# Patient Record
Sex: MALE | Race: WHITE | Employment: OTHER | URBAN - METROPOLITAN AREA
[De-identification: names, ages, dates, MRNs, and addresses within clinical notes are randomized per-mention and may not be internally consistent; named-entity substitution may affect disease eponyms.]

---

## 2021-07-06 ENCOUNTER — NURSE TRIAGE (OUTPATIENT)
Dept: OTHER | Age: 66
End: 2021-07-06

## 2021-07-06 NOTE — TELEPHONE ENCOUNTER
Additional Information   Commented on: Answer Assessment - Initial Assessment Questions     Caller was informed to be evaluated at the ED in the next 3-4 hours and ot call back for additional questions and agreed.     Brief description of triage: See above    Triage indicates for patient to ED in 3-4 hours    Protocols used: NEUROLOGIC DEFICIT-ADULT-AH

## 2021-07-06 NOTE — TELEPHONE ENCOUNTER
Reason for Disposition   [1] Numbness (i.e., loss of sensation) of the face, arm / hand, or leg / foot on one side of the body AND [2] gradual onset (e.g., days to weeks) AND [3] present now    Answer Assessment - Initial Assessment Questions  1. LOCATION: \"Where is the swelling located? \"  (e.g., left, right, both knees)        Right knee     2. SIZE and DESCRIPTION: \"What does the swelling look like? \"  (e.g., entire knee, localized)       Small bubble that is black and blue a couple inches down from knee, above calf      3. ONSET: \"When did the swelling start? \" \"Does it come and go, or is it there all the time? \"       July 4th    4. PAIN: \"Is there any pain? \" If so, ask: \"How bad is it? \" (Scale 1-10; or mild, moderate, severe)        0/10 when feet up, walking 3/10    5. SETTING: \"Has there been any recent work, exercise or other activity that involved that part of the body? \"        Normally does a lot of walking    6. AGGRAVATING FACTORS: \"What makes the knee swelling worse? \" (e.g., walking, climbing stairs, running)       None    7. ASSOCIATED SYMPTOMS: \"Is there any pain or redness? \"        Pain, black and blue    8. OTHER SYMPTOMS: \"Do you have any other symptoms? \" (e.g., chest pain, difficulty breathing, fever, calf pain)         None      Caller stated he has a vein on the inside of his knee that popped up. Caller stated he has knee pain and numbness  to his toes. Caller stated he has a Small bubble that is black and blue a couple inches down from knee, above calf that he noticed July 4th. Pain is 4/10 which he noticed that has been present for over a couple of weeks. Answer Assessment - Initial Assessment Questions  1. SYMPTOM: \"What is the main symptom you are concerned about? \" (e.g., weakness, numbness)        Numbness and tingling    2. ONSET: \"When did this start? \" (minutes, hours, days; while sleeping)        This morning    3.  LAST NORMAL: \"When was the last time you were normal (no symptoms)? \"        Yesterday    4. PATTERN \"Does this come and go, or has it been constant since it started? \"  \"Is it present now? \"        Constant    5. CARDIAC SYMPTOMS: \"Have you had any of the following symptoms: chest pain, difficulty breathing, palpitations? \"       None    6. NEUROLOGIC SYMPTOMS: \"Have you had any of the following symptoms: headache, dizziness, vision loss, double vision, changes in speech, unsteady on your feet? \"       None    7. OTHER SYMPTOMS: \"Do you have any other symptoms? \"        None    Caller stated he has a vein on the inside of his knee that popped up. Caller stated he has knee pain and numbness  to his toes. Caller stated he has a Small bubble that is black and blue a couple inches down from knee, above calf that he noticed July 4th. Pain is 4/10 which he noticed that has been present for over a couple of weeks. Caller stated he has pain when walking on it.     Protocols used: NEUROLOGIC DEFICIT-ADULT-, KNEE SWELLING-ADULT-

## 2021-07-20 ENCOUNTER — TELEPHONE (OUTPATIENT)
Dept: VASCULAR SURGERY | Age: 66
End: 2021-07-20

## 2021-07-20 NOTE — TELEPHONE ENCOUNTER
Received referral from Dr. Jose Manuel Tian for phlebitis, left message for patient to schedule appointment to see Dr. Jenn Walsh.

## 2021-08-04 ENCOUNTER — TELEPHONE (OUTPATIENT)
Dept: VASCULAR SURGERY | Age: 66
End: 2021-08-04

## 2021-08-05 ENCOUNTER — OFFICE VISIT (OUTPATIENT)
Dept: VASCULAR SURGERY | Age: 66
End: 2021-08-05
Payer: COMMERCIAL

## 2021-08-05 VITALS — SYSTOLIC BLOOD PRESSURE: 128 MMHG | DIASTOLIC BLOOD PRESSURE: 86 MMHG

## 2021-08-05 DIAGNOSIS — I82.411 ACUTE DEEP VEIN THROMBOSIS (DVT) OF FEMORAL VEIN OF RIGHT LOWER EXTREMITY (HCC): ICD-10-CM

## 2021-08-05 DIAGNOSIS — M79.89 LEG SWELLING: ICD-10-CM

## 2021-08-05 DIAGNOSIS — I80.01 SUPERFICIAL PHLEBITIS OF RIGHT LEG: ICD-10-CM

## 2021-08-05 DIAGNOSIS — I83.813 VARICOSE VEINS OF BOTH LOWER EXTREMITIES WITH PAIN: ICD-10-CM

## 2021-08-05 PROCEDURE — G8427 DOCREV CUR MEDS BY ELIG CLIN: HCPCS | Performed by: SURGERY

## 2021-08-05 PROCEDURE — G8421 BMI NOT CALCULATED: HCPCS | Performed by: SURGERY

## 2021-08-05 PROCEDURE — 99204 OFFICE O/P NEW MOD 45 MIN: CPT | Performed by: SURGERY

## 2021-08-05 PROCEDURE — 1036F TOBACCO NON-USER: CPT | Performed by: SURGERY

## 2021-08-05 PROCEDURE — 4040F PNEUMOC VAC/ADMIN/RCVD: CPT | Performed by: SURGERY

## 2021-08-05 PROCEDURE — 3017F COLORECTAL CA SCREEN DOC REV: CPT | Performed by: SURGERY

## 2021-08-05 PROCEDURE — 1123F ACP DISCUSS/DSCN MKR DOCD: CPT | Performed by: SURGERY

## 2021-08-05 RX ORDER — IBUPROFEN 200 MG
200 TABLET ORAL PRN
COMMUNITY
End: 2021-11-25

## 2021-08-05 RX ORDER — APIXABAN 5 MG/1
5 TABLET, FILM COATED ORAL 2 TIMES DAILY
COMMUNITY
Start: 2021-07-29

## 2021-08-05 RX ORDER — LOSARTAN POTASSIUM AND HYDROCHLOROTHIAZIDE 25; 100 MG/1; MG/1
TABLET ORAL DAILY
Status: ON HOLD | COMMUNITY
Start: 2021-07-17 | End: 2021-12-01 | Stop reason: HOSPADM

## 2021-08-05 NOTE — PROGRESS NOTES
Chief Complaint:   Chief Complaint   Patient presents with    Surgical Consult     Phlebitis         HPI: Patient came to the office with his wife, for the evaluation of vascular status of the right leg, tells me, on July 4, he flew to [place], did some hiking trip, when he got there, noticed some pain and discomfort of the medial aspect of the right calf and the thigh, went to the local healthcare facility, was told that he has superficial thrombophlebitis not to worry, subsequently he finished his vacation, came back home, had some swelling of the right leg, went to see his PCP, underwent a venous ultrasound study, revealed evidence of thrombophlebitis of the varicose veins of the right leg, with extension of thrombus into the femoral vein, was started on Eliquis and was referred here for further evaluation    Patient does give history of varicose veins bilateral right leg more than the left leg    Patient denies any history of chest pain or shortness of breath       The patient denies any previous history of thrombophlebitis          Patient denies any focal lateralizing neurological symptoms like loss of speech, vision or loss of function of extremity    Patient can walk a few miles, and denies any symptoms of rest pain    No Known Allergies    Current Outpatient Medications   Medication Sig Dispense Refill    losartan-hydroCHLOROthiazide (HYZAAR) 100-25 MG per tablet daily      ELIQUIS 5 MG TABS tablet Take 5 mg by mouth 2 times daily      ibuprofen (ADVIL;MOTRIN) 200 MG tablet Take 200 mg by mouth as needed for Pain      Elastic Bandages & Supports ([employer] KNEE HIGH COMPRESSION SM) MISC Knee high with 20- 30 mmhg of compression 1 each 10     No current facility-administered medications for this visit.        Past Medical History:   Diagnosis Date    Acute deep vein thrombosis (DVT) of femoral vein of right lower extremity ([address] 8/5/2021    DVT of leg (deep venous thrombosis) (HCC)     right    Hypertension  Leg swelling 8/5/2021    Superficial phlebitis of right leg 8/5/2021    Varicose veins of both lower extremities with pain 8/5/2021       Past Surgical History:   Procedure Laterality Date    COLONOSCOPY      HERNIA REPAIR Left     WISDOM TOOTH EXTRACTION         Family History   Problem Relation Age of Onset    Other Mother         (R) ankle swelling    Other Father         (R) ankle swelling    High Blood Pressure Father        Social History     Socioeconomic History    Marital status:      Spouse name: Not on file    Number of children: Not on file    Years of education: Not on file    Highest education level: Not on file   Occupational History    Not on file   Tobacco Use    Smoking status: Never Smoker    Smokeless tobacco: Never Used   Vaping Use    Vaping Use: Never used   Substance and Sexual Activity    Alcohol use: Not Currently     Comment: very rare    Drug use: Never    Sexual activity: Not on file   Other Topics Concern    Not on file   Social History Narrative    Not on file     Social Determinants of Health     Financial Resource Strain:     Difficulty of Paying Living Expenses:    Food Insecurity:     Worried About Running Out of Food in the Last Year:     Ran Out of Food in the Last Year:    Transportation Needs:     Lack of Transportation (Medical):      Lack of Transportation (Non-Medical):    Physical Activity:     Days of Exercise per Week:     Minutes of Exercise per Session:    Stress:     Feeling of Stress :    Social Connections:     Frequency of Communication with Friends and Family:     Frequency of Social Gatherings with Friends and Family:     Attends Congregational Services:     Active Member of Clubs or Organizations:     Attends Club or Organization Meetings:     Marital Status:    Intimate Partner Violence:     Fear of Current or Ex-Partner:     Emotionally Abused:     Physically Abused:     Sexually Abused:        Review of Systems:  Skin:  No abnormal pigmentation or rash  Eyes:  No blurring, diplopia or vision loss  Ears/Nose/Throat:  No hearing loss or vertigo  Respiratory:  No cough, pleuritic chest pain, dyspnea, or wheezing. Cardiovascular: No angina, palpitations . Hypertension  Gastrointestinal:  No nausea or vomiting; no abdominal pain or rectal bleeding  Musculoskeletal:  No arthritis or weakness. Neurologic:  No paralysis, paresis, paresthesia, seizures or headaches  Hematologic/Lymphatic/Immunologic:  No anemia, abnormal bleeding/bruising, fever, chills or night sweats. Endocrine:  No heat or cold intolerance. No polyphagia, polydipsia or polyuria. Physical Exam:  General appearance:  Alert, awake, oriented x 3. No distress. Skin:  Warm and dry  Head:  Normocephalic. No masses, lesions or tenderness  Eyes:  Conjunctivae appear normal; PERRL  Ears:  External ears normal  Nose/Sinuses:  Septum midline, mucosa normal; no drainage  Oropharynx:  Clear, no exudate noted  Neck:  No jugular venous distention, lymphadenopathy or thyromegaly. No evidence of carotid bruit  Lungs:  Clear to ausculation bilaterally. No rhonchi, crackles, wheezes  Heart:  Regular rate and rhythm. No rub or murmur  Abdomen:  Soft, non-tender. No masses, organomegaly. Musculoskeletal : No joint effusions, tenderness swelling    Neuro: Speech is intact. Moving all extremities. No focal motor or sensory deficits      Extremities:  Both feet are warm to touch.  The color of both feet is normal.    Patient does have areas of resolving superficial thrombophlebitis of large varicose veins of the medial aspect right calf and the knee and the the thigh, cordlike structure could be felt even in the mid thigh going towards the groin with mild tenderness    Mild varicose veins noticed over the left leg    Pulses Right  Left    Brachial 3 3    Radial    3=normal   Femoral 2 2  2=diminished   Popliteal    1=barely palpable   Dorsalis pedis 2 2 0=absent   Posterior tibial    4=aneurysmal             Other pertinent information:1. The past medical records were reviewed. 2.  The venous ultrasound study done at the outside facility, was personally reviewed by me, revealed evidence of thrombosis of the greater saphenous vein with thrombus extending into the femoral vein by approximately 1 cm plus      Assessment:    1. Leg swelling    2. Acute deep vein thrombosis (DVT) of femoral vein of right lower extremity (HCC)    3. Varicose veins of both lower extremities with pain    4. Superficial phlebitis of right leg              Plan:       Discussed with the patient and his wife regarding my interpretation of venous ultrasound, thrombophlebitis of the varicose veins of the right leg with thrombus extending the femoral vein by 1 cm plus, recommend anticoagulant for total of 3 to 4 months followed by low-dose aspirin therapy on a long-term basis and to call me as needed if it increases the pain or swelling of the leg in the future     Patient also recommend to wear light compression stockings during the daytime    Patient also was sized to make sure that he is up-to-date with his colonoscopy and prostate screening     All the questions were answered. Orders Placed This Encounter   Medications    Elastic Bandages & Supports ([facility] KNEE HIGH COMPRESSION ) MISC     Sig: Knee high with 20- 30 mmhg of compression     Dispense:  1 each     Refill:  10           Indicated follow-up: Return in about 4 months (around 12/5/2021), or if symptoms worsen or fail to improve.

## 2021-11-25 ENCOUNTER — HOSPITAL ENCOUNTER (INPATIENT)
Age: 66
LOS: 7 days | Discharge: HOME | DRG: 177 | End: 2021-12-02
Attending: EMERGENCY MEDICINE | Admitting: INTERNAL MEDICINE
Payer: COMMERCIAL

## 2021-11-25 ENCOUNTER — APPOINTMENT (OUTPATIENT)
Dept: GENERAL RADIOLOGY | Age: 66
DRG: 177 | End: 2021-11-25
Payer: COMMERCIAL

## 2021-11-25 DIAGNOSIS — J96.00 ACUTE RESPIRATORY FAILURE DUE TO COVID-19 (HCC): Primary | ICD-10-CM

## 2021-11-25 DIAGNOSIS — Z79.01 CHRONIC ANTICOAGULATION: ICD-10-CM

## 2021-11-25 DIAGNOSIS — R55 SYNCOPE AND COLLAPSE: ICD-10-CM

## 2021-11-25 DIAGNOSIS — U07.1 ACUTE RESPIRATORY FAILURE DUE TO COVID-19 (HCC): Primary | ICD-10-CM

## 2021-11-25 PROBLEM — I10 PRIMARY HYPERTENSION: Status: ACTIVE | Noted: 2021-11-25

## 2021-11-25 PROBLEM — Z86.718 HISTORY OF DVT (DEEP VEIN THROMBOSIS): Status: ACTIVE | Noted: 2021-11-25

## 2021-11-25 PROBLEM — R09.02 HYPOXIA: Status: ACTIVE | Noted: 2021-11-25

## 2021-11-25 PROBLEM — R79.89 ELEVATED SERUM CREATININE: Status: ACTIVE | Noted: 2021-11-25

## 2021-11-25 LAB
ALBUMIN SERPL-MCNC: 3.5 G/DL (ref 3.5–5.2)
ALP BLD-CCNC: 54 U/L (ref 40–129)
ALT SERPL-CCNC: 23 U/L (ref 0–40)
ANION GAP SERPL CALCULATED.3IONS-SCNC: 13 MMOL/L (ref 7–16)
APTT: 27.9 SEC (ref 24.5–35.1)
AST SERPL-CCNC: 31 U/L (ref 0–39)
BASOPHILS ABSOLUTE: 0.01 E9/L (ref 0–0.2)
BASOPHILS RELATIVE PERCENT: 0.2 % (ref 0–2)
BILIRUB SERPL-MCNC: 0.6 MG/DL (ref 0–1.2)
BUN BLDV-MCNC: 23 MG/DL (ref 6–23)
CALCIUM SERPL-MCNC: 8.1 MG/DL (ref 8.6–10.2)
CHLORIDE BLD-SCNC: 98 MMOL/L (ref 98–107)
CO2: 23 MMOL/L (ref 22–29)
CREAT SERPL-MCNC: 1.3 MG/DL (ref 0.7–1.2)
D DIMER: <200 NG/ML DDU
EOSINOPHILS ABSOLUTE: 0.01 E9/L (ref 0.05–0.5)
EOSINOPHILS RELATIVE PERCENT: 0.2 % (ref 0–6)
GFR AFRICAN AMERICAN: >60
GFR NON-AFRICAN AMERICAN: 55 ML/MIN/1.73
GLUCOSE BLD-MCNC: 129 MG/DL (ref 74–99)
HCT VFR BLD CALC: 41.4 % (ref 37–54)
HEMOGLOBIN: 14.4 G/DL (ref 12.5–16.5)
IMMATURE GRANULOCYTES #: 0.02 E9/L
IMMATURE GRANULOCYTES %: 0.4 % (ref 0–5)
INR BLD: 1.3
LACTIC ACID, SEPSIS: 1 MMOL/L (ref 0.5–1.9)
LYMPHOCYTES ABSOLUTE: 0.44 E9/L (ref 1.5–4)
LYMPHOCYTES RELATIVE PERCENT: 8.6 % (ref 20–42)
MAGNESIUM: 1.9 MG/DL (ref 1.6–2.6)
MCH RBC QN AUTO: 29.6 PG (ref 26–35)
MCHC RBC AUTO-ENTMCNC: 34.8 % (ref 32–34.5)
MCV RBC AUTO: 85 FL (ref 80–99.9)
MONOCYTES ABSOLUTE: 0.31 E9/L (ref 0.1–0.95)
MONOCYTES RELATIVE PERCENT: 6.1 % (ref 2–12)
NEUTROPHILS ABSOLUTE: 4.3 E9/L (ref 1.8–7.3)
NEUTROPHILS RELATIVE PERCENT: 84.5 % (ref 43–80)
PDW BLD-RTO: 12.6 FL (ref 11.5–15)
PLATELET # BLD: 135 E9/L (ref 130–450)
PMV BLD AUTO: 9.1 FL (ref 7–12)
POTASSIUM SERPL-SCNC: 3.2 MMOL/L (ref 3.5–5)
PRO-BNP: 74 PG/ML (ref 0–125)
PROTHROMBIN TIME: 14.6 SEC (ref 9.3–12.4)
RBC # BLD: 4.87 E12/L (ref 3.8–5.8)
SARS-COV-2, NAAT: DETECTED
SODIUM BLD-SCNC: 134 MMOL/L (ref 132–146)
TOTAL PROTEIN: 6.9 G/DL (ref 6.4–8.3)
TROPONIN, HIGH SENSITIVITY: 6 NG/L (ref 0–11)
WBC # BLD: 5.1 E9/L (ref 4.5–11.5)

## 2021-11-25 PROCEDURE — 6370000000 HC RX 637 (ALT 250 FOR IP): Performed by: EMERGENCY MEDICINE

## 2021-11-25 PROCEDURE — 2580000003 HC RX 258: Performed by: EMERGENCY MEDICINE

## 2021-11-25 PROCEDURE — 99284 EMERGENCY DEPT VISIT MOD MDM: CPT

## 2021-11-25 PROCEDURE — 85378 FIBRIN DEGRADE SEMIQUANT: CPT

## 2021-11-25 PROCEDURE — 2140000000 HC CCU INTERMEDIATE R&B

## 2021-11-25 PROCEDURE — 87040 BLOOD CULTURE FOR BACTERIA: CPT

## 2021-11-25 PROCEDURE — 96361 HYDRATE IV INFUSION ADD-ON: CPT

## 2021-11-25 PROCEDURE — 6360000002 HC RX W HCPCS: Performed by: EMERGENCY MEDICINE

## 2021-11-25 PROCEDURE — 93005 ELECTROCARDIOGRAM TRACING: CPT | Performed by: EMERGENCY MEDICINE

## 2021-11-25 PROCEDURE — 87635 SARS-COV-2 COVID-19 AMP PRB: CPT

## 2021-11-25 PROCEDURE — 36415 COLL VENOUS BLD VENIPUNCTURE: CPT

## 2021-11-25 PROCEDURE — 85610 PROTHROMBIN TIME: CPT

## 2021-11-25 PROCEDURE — 85730 THROMBOPLASTIN TIME PARTIAL: CPT

## 2021-11-25 PROCEDURE — 83605 ASSAY OF LACTIC ACID: CPT

## 2021-11-25 PROCEDURE — 96374 THER/PROPH/DIAG INJ IV PUSH: CPT

## 2021-11-25 PROCEDURE — 83880 ASSAY OF NATRIURETIC PEPTIDE: CPT

## 2021-11-25 PROCEDURE — 85025 COMPLETE CBC W/AUTO DIFF WBC: CPT

## 2021-11-25 PROCEDURE — 80053 COMPREHEN METABOLIC PANEL: CPT

## 2021-11-25 PROCEDURE — 71045 X-RAY EXAM CHEST 1 VIEW: CPT

## 2021-11-25 PROCEDURE — 84484 ASSAY OF TROPONIN QUANT: CPT

## 2021-11-25 PROCEDURE — 83735 ASSAY OF MAGNESIUM: CPT

## 2021-11-25 RX ORDER — ACETAMINOPHEN 500 MG
500 TABLET ORAL EVERY 6 HOURS PRN
COMMUNITY

## 2021-11-25 RX ORDER — DEXAMETHASONE SODIUM PHOSPHATE 4 MG/ML
10 INJECTION, SOLUTION INTRA-ARTICULAR; INTRALESIONAL; INTRAMUSCULAR; INTRAVENOUS; SOFT TISSUE ONCE
Status: COMPLETED | OUTPATIENT
Start: 2021-11-25 | End: 2021-11-25

## 2021-11-25 RX ORDER — MECOBALAMIN 5000 MCG
5 TABLET,DISINTEGRATING ORAL ONCE
Status: COMPLETED | OUTPATIENT
Start: 2021-11-25 | End: 2021-11-25

## 2021-11-25 RX ORDER — 0.9 % SODIUM CHLORIDE 0.9 %
1000 INTRAVENOUS SOLUTION INTRAVENOUS ONCE
Status: COMPLETED | OUTPATIENT
Start: 2021-11-25 | End: 2021-11-25

## 2021-11-25 RX ORDER — ACETAMINOPHEN/DIPHENHYDRAMINE 500MG-25MG
1 TABLET ORAL NIGHTLY PRN
COMMUNITY

## 2021-11-25 RX ORDER — AZITHROMYCIN 250 MG/1
TABLET, FILM COATED ORAL DAILY
Status: ON HOLD | COMMUNITY
Start: 2021-11-23 | End: 2021-12-01 | Stop reason: HOSPADM

## 2021-11-25 RX ADMIN — Medication 5 MG: at 22:54

## 2021-11-25 RX ADMIN — DEXAMETHASONE SODIUM PHOSPHATE 10 MG: 4 INJECTION, SOLUTION INTRAMUSCULAR; INTRAVENOUS at 15:17

## 2021-11-25 RX ADMIN — SODIUM CHLORIDE 1000 ML: 9 INJECTION, SOLUTION INTRAVENOUS at 14:32

## 2021-11-25 NOTE — ED PROVIDER NOTES
Department of Emergency Medicine   ED  Provider Note  Admit Date/RoomTime: 11/25/2021  1:45 PM  ED Room: [address] [place] [address]          History of Present Illness:  11/25/21, Time: 2:47 PM EST  Chief Complaint   Patient presents with    Loss of Consciousness     Near LOC at home while cooking with his family, he states that he hasnt been feeling good and hasn't really eaten                Love Andrea is a 77 y.o. male presenting to the ED for loss of consciousness, beginning less than 1 hour. The complaint has been intermittent, moderate in severity, and worsened by standing. Patient presents for loss of consciousness. Presents via EMS, states has had \"a cold\" for about a week, saw his PCP, has been started on azithromycin. He was not vaccinated against COVID-19 and has not yet been tested. States he is not had much of an appetite having difficulty moving his bowels, he was standing preparing Thanksgiving dinner in the about to carve the turkey when he felt very lightheaded, sat down, was lowered to the ground by his family members. Does not believe he hit his head. Did not bite his tongue, is unsure if he urinated on himself. History significant for previous DVT, he states ask compliance with his oral anticoagulation. He initially arrived on oxygen, he was transitioned to room air quickly desaturated to the 80s, placed back on oxygen. Review of Systems:   Pertinent positives and negatives are stated within HPI, all other systems reviewed and are negative.        --------------------------------------------- PAST HISTORY ---------------------------------------------  Past Medical History:  has a past medical history of Acute deep vein thrombosis (DVT) of femoral vein of right lower extremity ([address] DVT of leg (deep venous thrombosis) ([facility] [address] Hypertension, Leg swelling, Superficial phlebitis of right leg, and Varicose veins of both lower extremities with pain.     Past Surgical History:  has a past surgical history that includes hernia repair (Left); Colonoscopy; and [place] tooth extraction. Social History:  reports that he has never smoked. He has never used smokeless tobacco. He reports previous alcohol use. He reports that he does not use drugs. Family History: family history includes High Blood Pressure in his father; Other in his father and mother. . Unless otherwise noted, family history is non contributory    The patients home medications have been reviewed. Allergies: Patient has no known allergies. ---------------------------------------------------PHYSICAL EXAM--------------------------------------    Constitutional/General: Alert and oriented x3  Head: Normocephalic and atraumatic  Eyes: PERRL, EOMI, sclera non icteric  Mouth: Oropharynx clear, handling secretions, no trismus, no asymmetry of the posterior oropharynx or uvular edema  Neck: Supple, full ROM, no stridor, no meningeal signs  Respiratory: Lungs diminished throughout,Not in respiratory distress  Cardiovascular:  Regular rate. Regular rhythm. 2+ distal pulses. Equal extremity pulses. Chest: No chest wall tenderness  GI:  Abdomen Soft, Non tender, Non distended. No rebound, guarding, or rigidity. Musculoskeletal: Moves all extremities x 4. Warm and well perfused, no clubbing, cyanosis, or edema. Capillary refill <3 seconds  Integument: skin warm and dry. No rashes. Neurologic: GCS 15, no focal deficits, symmetric strength 5/5 in the upper and lower extremities bilaterally  Psychiatric: Normal Affect      EKG: Interpreted by emergency department physician, Dr. Yuly Franks   This EKG is signed and interpreted by me.     Rate: 67  Rhythm: Sinus  Interpretation: Sinus rhythm, right axis, right bundle branch block, SD is 168, QRS is 120, QTc is 452, nonspecific changes without prior for comparison  Comparison: no previous EKG available      -------------------------------------------------- RESULTS -------------------------------------------------  I have personally reviewed all laboratory and imaging results for this patient. Results are listed below.      LABS: (Lab results interpreted by me)  Results for orders placed or performed during the hospital encounter of 11/25/21   COVID-19, Rapid    Specimen: Nasopharyngeal Swab   Result Value Ref Range    SARS-CoV-2, NAAT DETECTED (A) Not Detected   Lactate, Sepsis   Result Value Ref Range    Lactic Acid, Sepsis 1.0 0.5 - 1.9 mmol/L   Troponin   Result Value Ref Range    Troponin, High Sensitivity 6 0 - 11 ng/L   CBC Auto Differential   Result Value Ref Range    WBC 5.1 4.5 - 11.5 E9/L    RBC 4.87 3.80 - 5.80 E12/L    Hemoglobin 14.4 12.5 - 16.5 g/dL    Hematocrit 41.4 37.0 - 54.0 %    MCV 85.0 80.0 - 99.9 fL    MCH 29.6 26.0 - 35.0 pg    MCHC 34.8 (H) 32.0 - 34.5 %    RDW 12.6 11.5 - 15.0 fL    Platelets 067 050 - 242 E9/L    MPV 9.1 7.0 - 12.0 fL   Comprehensive Metabolic Panel   Result Value Ref Range    Sodium 134 132 - 146 mmol/L    Potassium 3.2 (L) 3.5 - 5.0 mmol/L    Chloride 98 98 - 107 mmol/L    CO2 23 22 - 29 mmol/L    Anion Gap 13 7 - 16 mmol/L    Glucose 129 (H) 74 - 99 mg/dL    BUN 23 6 - 23 mg/dL    CREATININE 1.3 (H) 0.7 - 1.2 mg/dL    GFR Non-African American 55 >=60 mL/min/1.73    GFR African American >60     Calcium 8.1 (L) 8.6 - 10.2 mg/dL    Total Protein 6.9 6.4 - 8.3 g/dL    Albumin 3.5 3.5 - 5.2 g/dL    Total Bilirubin 0.6 0.0 - 1.2 mg/dL    Alkaline Phosphatase 54 40 - 129 U/L    ALT 23 0 - 40 U/L    AST 31 0 - 39 U/L   Protime-INR   Result Value Ref Range    Protime 14.6 (H) 9.3 - 12.4 sec    INR 1.3    APTT   Result Value Ref Range    aPTT 27.9 24.5 - 35.1 sec   Brain Natriuretic Peptide   Result Value Ref Range    Pro-BNP 74 0 - 125 pg/mL   Magnesium   Result Value Ref Range    Magnesium 1.9 1.6 - 2.6 mg/dL   D-Dimer, Quantitative   Result Value Ref Range    D-Dimer, Quant <200 ng/mL DDU   ,       RADIOLOGY:  Interpreted by Radiologist unless otherwise specified  XR CHEST PORTABLE   Final Result   1. Slightly limited exam      2. Minimal/mild bibasilar subsegmental atelectasis, and equivalent bilateral   pulmonary hypoinflation/elevation of the hemidiaphragms, all overall slightly   worse on the right. .      RECOMMENDATION:   1. Recommend follow-up thoracic imaging, as directed clinically. Kary Jacobson ------------------------- NURSING NOTES AND VITALS REVIEWED ---------------------------   The nursing notes within the ED encounter and vital signs as below have been reviewed by myself  BP 95/63   Pulse 70   Temp 98 °F (36.7 °C) (Oral)   Resp 17   SpO2 93%     Oxygen Saturation Interpretation: Abnormal    The cardiac monitor revealed NSR with a heart rate in the 70s as interpreted by me. The cardiac monitor was ordered secondary to the patient's heart rate and to monitor the patient for dysrhythmia. CPT 99523    The patients available past medical records and past encounters were reviewed. ------------------------------ ED COURSE/MEDICAL DECISION MAKING----------------------  Medications   0.9 % sodium chloride bolus (1,000 mLs IntraVENous New Bag 11/25/21 1432)   dexamethasone (DECADRON) injection 10 mg (10 mg IntraVENous Given 11/25/21 1517)                    Medical Decision Making:     I, Dr. Gabo Ashby am the primary provider of record    Syncopal episode at home, patient was hypoxic here requiring submental oxygen, spoke with medicine patient kept for further care      Re-Evaluations:          Re-evaluation.   Patients symptoms are improving  Repeat physical examination is improved        This patient's ED course included: a personal history and physicial examination, re-evaluation prior to disposition, IV medications, cardiac monitoring, continuous pulse oximetry and complex medical decision making and emergency management    This patient has been closely monitored during their ED course. Consultations:  Spoke with NP for Dr. Kalyn Olivera (Medicine). Discussed case. They will admit this patient. Critical Care:         Counseling: The emergency provider has spoken with the patient and discussed todays results, in addition to providing specific details for the plan of care and counseling regarding the diagnosis and prognosis. Questions are answered at this time and they are agreeable with the plan.       --------------------------------- IMPRESSION AND DISPOSITION ---------------------------------    IMPRESSION  1. Acute respiratory failure due to COVID-19 ([facility] [address]    2. Syncope and collapse    3. Chronic anticoagulation        DISPOSITION  Disposition: Admit to telemetry  Patient condition is stable        NOTE: This report was transcribed using voice recognition software.  Every effort was made to ensure accuracy; however, inadvertent computerized transcription errors may be present       Olesya Ford DO  11/25/21 5799

## 2021-11-25 NOTE — ED NOTES
Bed: 14A-14  Expected date:   Expected time:   Means of arrival:   Comments:  [address] Garry Layne RN  11/25/21 0715

## 2021-11-26 LAB
ANION GAP SERPL CALCULATED.3IONS-SCNC: 12 MMOL/L (ref 7–16)
BASOPHILS ABSOLUTE: 0 E9/L (ref 0–0.2)
BASOPHILS RELATIVE PERCENT: 0 % (ref 0–2)
BUN BLDV-MCNC: 23 MG/DL (ref 6–23)
BURR CELLS: ABNORMAL
C-REACTIVE PROTEIN: 3.3 MG/DL (ref 0–0.4)
C-REACTIVE PROTEIN: 3.3 MG/DL (ref 0–0.4)
CALCIUM SERPL-MCNC: 8.3 MG/DL (ref 8.6–10.2)
CHLORIDE BLD-SCNC: 101 MMOL/L (ref 98–107)
CO2: 22 MMOL/L (ref 22–29)
CREAT SERPL-MCNC: 1.2 MG/DL (ref 0.7–1.2)
EKG ATRIAL RATE: 67 BPM
EKG P AXIS: 14 DEGREES
EKG P-R INTERVAL: 168 MS
EKG Q-T INTERVAL: 428 MS
EKG QRS DURATION: 120 MS
EKG QTC CALCULATION (BAZETT): 452 MS
EKG R AXIS: -28 DEGREES
EKG T AXIS: 16 DEGREES
EKG VENTRICULAR RATE: 67 BPM
EOSINOPHILS ABSOLUTE: 0 E9/L (ref 0.05–0.5)
EOSINOPHILS RELATIVE PERCENT: 0 % (ref 0–6)
FERRITIN: 479 NG/ML
FIBRINOGEN: 613 MG/DL (ref 225–540)
GFR AFRICAN AMERICAN: >60
GFR NON-AFRICAN AMERICAN: >60 ML/MIN/1.73
GLUCOSE BLD-MCNC: 169 MG/DL (ref 74–99)
HCT VFR BLD CALC: 40.2 % (ref 37–54)
HEMOGLOBIN: 13.9 G/DL (ref 12.5–16.5)
LACTATE DEHYDROGENASE: 184 U/L (ref 135–225)
LACTIC ACID, SEPSIS: 2.5 MMOL/L (ref 0.5–1.9)
LYMPHOCYTES ABSOLUTE: 0.21 E9/L (ref 1.5–4)
LYMPHOCYTES RELATIVE PERCENT: 2.6 % (ref 20–42)
MCH RBC QN AUTO: 29.5 PG (ref 26–35)
MCHC RBC AUTO-ENTMCNC: 34.6 % (ref 32–34.5)
MCV RBC AUTO: 85.4 FL (ref 80–99.9)
MONOCYTES ABSOLUTE: 0.14 E9/L (ref 0.1–0.95)
MONOCYTES RELATIVE PERCENT: 1.7 % (ref 2–12)
NEUTROPHILS ABSOLUTE: 6.62 E9/L (ref 1.8–7.3)
NEUTROPHILS RELATIVE PERCENT: 95.7 % (ref 43–80)
OVALOCYTES: ABNORMAL
PDW BLD-RTO: 12.4 FL (ref 11.5–15)
PLATELET # BLD: 152 E9/L (ref 130–450)
PMV BLD AUTO: 8.9 FL (ref 7–12)
POIKILOCYTES: ABNORMAL
POTASSIUM REFLEX MAGNESIUM: 3.6 MMOL/L (ref 3.5–5)
RBC # BLD: 4.71 E12/L (ref 3.8–5.8)
SODIUM BLD-SCNC: 135 MMOL/L (ref 132–146)
WBC # BLD: 6.9 E9/L (ref 4.5–11.5)

## 2021-11-26 PROCEDURE — 2580000003 HC RX 258: Performed by: INTERNAL MEDICINE

## 2021-11-26 PROCEDURE — 83605 ASSAY OF LACTIC ACID: CPT

## 2021-11-26 PROCEDURE — XW0DXM6 INTRODUCTION OF BARICITINIB INTO MOUTH AND PHARYNX, EXTERNAL APPROACH, NEW TECHNOLOGY GROUP 6: ICD-10-PCS | Performed by: INTERNAL MEDICINE

## 2021-11-26 PROCEDURE — 82728 ASSAY OF FERRITIN: CPT

## 2021-11-26 PROCEDURE — 86140 C-REACTIVE PROTEIN: CPT

## 2021-11-26 PROCEDURE — 6370000000 HC RX 637 (ALT 250 FOR IP): Performed by: NURSE PRACTITIONER

## 2021-11-26 PROCEDURE — 83615 LACTATE (LD) (LDH) ENZYME: CPT

## 2021-11-26 PROCEDURE — 93010 ELECTROCARDIOGRAM REPORT: CPT | Performed by: INTERNAL MEDICINE

## 2021-11-26 PROCEDURE — 80048 BASIC METABOLIC PNL TOTAL CA: CPT

## 2021-11-26 PROCEDURE — 6360000002 HC RX W HCPCS: Performed by: NURSE PRACTITIONER

## 2021-11-26 PROCEDURE — 2500000003 HC RX 250 WO HCPCS: Performed by: INTERNAL MEDICINE

## 2021-11-26 PROCEDURE — 6370000000 HC RX 637 (ALT 250 FOR IP): Performed by: FAMILY MEDICINE

## 2021-11-26 PROCEDURE — 85384 FIBRINOGEN ACTIVITY: CPT

## 2021-11-26 PROCEDURE — 2140000000 HC CCU INTERMEDIATE R&B

## 2021-11-26 PROCEDURE — XW033E5 INTRODUCTION OF REMDESIVIR ANTI-INFECTIVE INTO PERIPHERAL VEIN, PERCUTANEOUS APPROACH, NEW TECHNOLOGY GROUP 5: ICD-10-PCS | Performed by: INTERNAL MEDICINE

## 2021-11-26 PROCEDURE — 2580000003 HC RX 258: Performed by: NURSE PRACTITIONER

## 2021-11-26 PROCEDURE — 85025 COMPLETE CBC W/AUTO DIFF WBC: CPT

## 2021-11-26 PROCEDURE — 6370000000 HC RX 637 (ALT 250 FOR IP): Performed by: PHYSICIAN ASSISTANT

## 2021-11-26 RX ORDER — POLYETHYLENE GLYCOL 3350 17 G/17G
17 POWDER, FOR SOLUTION ORAL DAILY PRN
Status: DISCONTINUED | OUTPATIENT
Start: 2021-11-26 | End: 2021-12-02 | Stop reason: HOSPADM

## 2021-11-26 RX ORDER — SODIUM CHLORIDE 0.9 % (FLUSH) 0.9 %
5-40 SYRINGE (ML) INJECTION EVERY 12 HOURS SCHEDULED
Status: DISCONTINUED | OUTPATIENT
Start: 2021-11-26 | End: 2021-12-02 | Stop reason: HOSPADM

## 2021-11-26 RX ORDER — ZINC SULFATE 50(220)MG
50 CAPSULE ORAL DAILY
Status: DISCONTINUED | OUTPATIENT
Start: 2021-11-26 | End: 2021-12-02 | Stop reason: HOSPADM

## 2021-11-26 RX ORDER — BENZONATATE 100 MG/1
100 CAPSULE ORAL EVERY 8 HOURS PRN
Status: DISCONTINUED | OUTPATIENT
Start: 2021-11-26 | End: 2021-12-02 | Stop reason: HOSPADM

## 2021-11-26 RX ORDER — ACETAMINOPHEN 650 MG/1
650 SUPPOSITORY RECTAL EVERY 6 HOURS PRN
Status: DISCONTINUED | OUTPATIENT
Start: 2021-11-26 | End: 2021-12-02 | Stop reason: HOSPADM

## 2021-11-26 RX ORDER — SODIUM CHLORIDE 9 MG/ML
INJECTION, SOLUTION INTRAVENOUS CONTINUOUS
Status: DISCONTINUED | OUTPATIENT
Start: 2021-11-26 | End: 2021-11-28

## 2021-11-26 RX ORDER — LANOLIN ALCOHOL/MO/W.PET/CERES
3 CREAM (GRAM) TOPICAL NIGHTLY PRN
Status: DISCONTINUED | OUTPATIENT
Start: 2021-11-26 | End: 2021-12-02 | Stop reason: HOSPADM

## 2021-11-26 RX ORDER — SODIUM CHLORIDE 0.9 % (FLUSH) 0.9 %
5-40 SYRINGE (ML) INJECTION PRN
Status: DISCONTINUED | OUTPATIENT
Start: 2021-11-26 | End: 2021-12-02 | Stop reason: HOSPADM

## 2021-11-26 RX ORDER — 0.9 % SODIUM CHLORIDE 0.9 %
30 INTRAVENOUS SOLUTION INTRAVENOUS PRN
Status: DISCONTINUED | OUTPATIENT
Start: 2021-11-26 | End: 2021-12-02 | Stop reason: HOSPADM

## 2021-11-26 RX ORDER — PROCHLORPERAZINE EDISYLATE 5 MG/ML
10 INJECTION INTRAMUSCULAR; INTRAVENOUS EVERY 6 HOURS PRN
Status: DISCONTINUED | OUTPATIENT
Start: 2021-11-26 | End: 2021-12-02 | Stop reason: HOSPADM

## 2021-11-26 RX ORDER — ALBUTEROL SULFATE 90 UG/1
2 AEROSOL, METERED RESPIRATORY (INHALATION) EVERY 6 HOURS
Status: DISCONTINUED | OUTPATIENT
Start: 2021-11-26 | End: 2021-11-29

## 2021-11-26 RX ORDER — DEXAMETHASONE SODIUM PHOSPHATE 10 MG/ML
6 INJECTION INTRAMUSCULAR; INTRAVENOUS DAILY
Status: DISCONTINUED | OUTPATIENT
Start: 2021-11-26 | End: 2021-11-27

## 2021-11-26 RX ORDER — ACETAMINOPHEN 325 MG/1
650 TABLET ORAL EVERY 6 HOURS PRN
Status: DISCONTINUED | OUTPATIENT
Start: 2021-11-26 | End: 2021-12-02 | Stop reason: HOSPADM

## 2021-11-26 RX ORDER — SODIUM CHLORIDE 9 MG/ML
25 INJECTION, SOLUTION INTRAVENOUS PRN
Status: DISCONTINUED | OUTPATIENT
Start: 2021-11-26 | End: 2021-12-02 | Stop reason: HOSPADM

## 2021-11-26 RX ORDER — ASCORBIC ACID 500 MG
500 TABLET ORAL DAILY
Status: DISCONTINUED | OUTPATIENT
Start: 2021-11-26 | End: 2021-12-02 | Stop reason: HOSPADM

## 2021-11-26 RX ADMIN — Medication 3 MG: at 23:13

## 2021-11-26 RX ADMIN — ALBUTEROL SULFATE 2 PUFF: 108 AEROSOL, METERED RESPIRATORY (INHALATION) at 20:02

## 2021-11-26 RX ADMIN — BARICITINIB 4 MG: 2 TABLET, FILM COATED ORAL at 13:03

## 2021-11-26 RX ADMIN — APIXABAN 5 MG: 5 TABLET, FILM COATED ORAL at 13:05

## 2021-11-26 RX ADMIN — DEXAMETHASONE SODIUM PHOSPHATE 6 MG: 10 INJECTION INTRAMUSCULAR; INTRAVENOUS at 13:04

## 2021-11-26 RX ADMIN — BISACODYL 5 MG: 5 TABLET, COATED ORAL at 23:14

## 2021-11-26 RX ADMIN — OXYCODONE HYDROCHLORIDE AND ACETAMINOPHEN 500 MG: 500 TABLET ORAL at 13:03

## 2021-11-26 RX ADMIN — APIXABAN 5 MG: 5 TABLET, FILM COATED ORAL at 20:02

## 2021-11-26 RX ADMIN — Medication 10 ML: at 13:03

## 2021-11-26 RX ADMIN — Medication 5 ML: at 20:03

## 2021-11-26 RX ADMIN — Medication 50 MG: at 13:03

## 2021-11-26 RX ADMIN — REMDESIVIR 200 MG: 100 INJECTION, POWDER, LYOPHILIZED, FOR SOLUTION INTRAVENOUS at 23:13

## 2021-11-26 ASSESSMENT — PAIN SCALES - GENERAL: PAINLEVEL_OUTOF10: 0

## 2021-11-26 NOTE — H&P
[address] [facility]  History and Physical      CHIEF COMPLAINT:    Chief Complaint   Patient presents with    Loss of Consciousness     Near LOC at home while cooking with his family, he states that he hasnt been feeling good and hasn't really eaten        Patient of Kalpana Haley MD presents with:  Acute respiratory failure due to COVID-19 [facility])    History of Present Illness:   Patient is a 59-year-old male with a past medical history of an acute DVT, and hypertension. Patient presented to the ED with complaints of shortness of breath cough and congestion x4 days. Patient states as he was cooking yesterday he also became very dizzy and weak. Patient states he was diagnosed with an acute DVT 1 month ago and was started on Eliquis and has a follow-up appointment with vascular. Patient's DVT was provoked from airplane ride out [place].  Patient stated his shortness of breath worsened with exertion and relieved with rest.  Patient thought he just had a cold and he went to see his PCP and he was started on azithromycin. Patient is not vaccinated for COVID-19. Patient is alert and oriented on examination. Patient denies any chest pain, fever, chills, abdominal pain, and headache. Patient is currently requiring 7 L O2 for saturation of 91%. Patient does not have a history of smoking. ER work-up reveals elevated CRP of 3.3, lactic acid 2.5, and Covid positive. Patient is admitted for further testing and treatment. REVIEW OF SYSTEMS:  Pertinent negatives are above in HPI. 10 point ROS otherwise negative.       Past Medical History:   Diagnosis Date    Acute deep vein thrombosis (DVT) of femoral vein of right lower extremity ([address] 8/5/2021    DVT of leg (deep venous thrombosis) (HCC)     right    Hypertension     Leg swelling 8/5/2021    Superficial phlebitis of right leg 8/5/2021    Varicose veins of both lower extremities with pain 8/5/2021         Past Surgical History:   Procedure Laterality Date    COLONOSCOPY      HERNIA REPAIR Left     WISDOM TOOTH EXTRACTION         Medications Prior to Admission:    Not in a hospital admission. Note that the patient's home medications were reviewed and the above list is accurate to the best of my knowledge at the time of the exam.    Allergies:    Patient has no known allergies. Social History:    reports that he has never smoked. He has never used smokeless tobacco. He reports previous alcohol use. He reports that he does not use drugs. Family History:   family history includes High Blood Pressure in his father; Other in his father and mother. PHYSICAL EXAM:    Vitals:  /65   Pulse 65   Temp 98 °F (36.7 °C) (Oral)   Resp 23   SpO2 92%       General appearance: NAD, conversant, pleasant  Eyes: Sclerae anicteric, PERRLA  HEENT: AT/NC, MMM  Neck: FROM, supple, no thyromegaly  Lymph: No cervical / supraclavicular lymphadenopathy  Lungs: Diminished throughout, 7 L O2  CV: RRR, no MRGs, no lower extremity edema  Abdomen: Soft, non-tender; no masses or HSM, +BS  Extremities: FROM without synovitis. No clubbing or cyanosis of the hands. Skin: no rash, induration, lesions, or ulcers  Psych: Calm and cooperative. Normal judgement and insight. Normal mood and affect. Neuro: Alert and interactive, face symmetric, speech fluent. LABS:  All labs reviewed.   Of note:  CBC with Differential:    Lab Results   Component Value Date    WBC 5.1 11/25/2021    RBC 4.87 11/25/2021    HGB 14.4 11/25/2021    HCT 41.4 11/25/2021     11/25/2021    MCV 85.0 11/25/2021    MCH 29.6 11/25/2021    MCHC 34.8 11/25/2021    RDW 12.6 11/25/2021    LYMPHOPCT 8.6 11/25/2021    MONOPCT 6.1 11/25/2021    BASOPCT 0.2 11/25/2021    MONOSABS 0.31 11/25/2021    LYMPHSABS 0.44 11/25/2021    EOSABS 0.01 11/25/2021    BASOSABS 0.01 11/25/2021     CMP:    Lab Results   Component Value Date     11/25/2021    K 3.2 11/25/2021    CL 98 11/25/2021    CO2 23 11/25/2021    BUN 23 11/25/2021    CREATININE 1.3 11/25/2021    GFRAA >60 11/25/2021    LABGLOM 55 11/25/2021    GLUCOSE 129 11/25/2021    PROT 6.9 11/25/2021    LABALBU 3.5 11/25/2021    CALCIUM 8.1 11/25/2021    BILITOT 0.6 11/25/2021    ALKPHOS 54 11/25/2021    AST 31 11/25/2021    ALT 23 11/25/2021       Imaging:  CXR: Minimal/mild bibasilar subsegmental atelectasis, and equivalent bilateral pulmonary hypoinflation/elevation of the hemidiaphragms all overall slightly worse on the right. EKG:  Normal sinus rhythm    Telemetry:  I've personally reviewed the patient's telemetry:      ASSESSMENT/PLAN:  Principal Problem:    Acute respiratory failure due to COVID-19 [facility])  Active Problems:    History of DVT (deep vein thrombosis)    Hypoxia    Primary hypertension    Elevated serum creatinine    Syncope  Resolved Problems:    * No resolved hospital problems. *    79-year-old male with a past medical history of hypertension and DVTs admitted to telemetry unit with    Covid  -Barcitinib 4 mg   -Decadron 6 mg daily  -Vitamin D, zinc, vitamin C  -Monitor O2 saturations and supplement oxygen to keep saturations greater than 93%, wean as necessary, incentive spirometer, no nebulizers  -Droplet plus isolation  -Encourage ISP use  -Follow inflammatory markers    Medication for other comorbidities continue as appropriate dose adjustment as necessary. DVT prophylaxis -Eliquis  PT OT  Discharge planning  Case discussed with attending and agreed upon plan of care. Code status: Full  Requires inpatient level of care  RADHA Mendoza - CNP    5:59 AM  11/26/2021     Above note edited to reflect my thoughts     Resting comfortably, not requiring oxygen on my evaluation-oxygen saturations in mid 90s, near normal inflammatory markers-continue to follow  Complains only of a thick productive cough    I personally saw, examined and provided care for the patient.  Radiographs, labs and medication list were reviewed by me independently. The case was discussed in detail and plans for care were established. Review of Shira GARCIA- CNP, documentation was conducted and revisions were made as appropriate directly by me. I agree with the above documented exam, problem list, and plan of care.      China Overton MD  11:36 AM  11/26/2021  Resting comfortably,

## 2021-11-26 NOTE — CARE COORDINATION
Social Work /Transition of Care:    Pt presented to the ED secondary to near loss of consciousness at home. Per report, pt has not been feeling well and has not been eating much. SW met with pt and completed ACP documentation. Pt was alert and oriented x3, sitting up in bed, on 5L oxygen. Pt reports living with his wife and is independent. PCP is Dr Samuel Lama and he uses [facility] to fill his prescriptions. Plan is to return home upon discharge. RAFITA/CM to follow for discharge needs.

## 2021-11-26 NOTE — ED NOTES
Pt's pulse ox noted to be 84% on 5L NC. Pt noted to have increased work of breathing. Pt placed on NRB. Pt's pulse ox improved to 94%. Pt's work of breathing also improved.       Maryam Seymour RN  11/26/21 6588

## 2021-11-26 NOTE — ED NOTES
Bed: 39  Expected date:   Expected time:   Means of arrival:   Comments:  Room 14A     Xu Kelley RN  11/26/21 3689

## 2021-11-26 NOTE — ED NOTES
Daughter Oni Shirley [phone] would like to be kept up to date on pt status/ changes. Pt and Pt's family want pt to be treated with monoclonal antibody treatment.       Rani Parra RN  11/26/21 [address], RN  11/26/21 8632

## 2021-11-26 NOTE — ED NOTES
Patient oxygen moved to 5L NC. Patient requesting melatonin for sleep.       Ann Marie Blair RN  11/25/21 3368

## 2021-11-26 NOTE — ACP (ADVANCE CARE PLANNING)
Advance Care Planning     Advance Care Planning Activator (Inpatient)  Conversation Note      Date of ACP Conversation: 11/26/2021     Conversation Conducted with: Patient with Decision Making Capacity    ACP Activator: SAM Gibbons        Health Care Decision Maker:     Current Designated Health Care Decision Maker:     Primary Decision Maker: gómez antonio - Spouse - [phone]  Click here to complete Healthcare Decision Makers including section of the Healthcare Decision Maker Relationship (ie \"Primary\")  Today we documented Decision Maker(s) consistent with Legal Next of Kin hierarchy. Care Preferences    Ventilation: \"If you were in your present state of health and suddenly became very ill and were unable to breathe on your own, what would your preference be about the use of a ventilator (breathing machine) if it were available to you? \"      Would the patient desire the use of ventilator (breathing machine)?: yes    \"If your health worsens and it becomes clear that your chance of recovery is unlikely, what would your preference be about the use of a ventilator (breathing machine) if it were available to you? \"     Would the patient desire the use of ventilator (breathing machine)?: Yes      Resuscitation  \"CPR works best to restart the heart when there is a sudden event, like a heart attack, in someone who is otherwise healthy. Unfortunately, CPR does not typically restart the heart for people who have serious health conditions or who are very sick. \"    \"In the event your heart stopped as a result of an underlying serious health condition, would you want attempts to be made to restart your heart (answer \"yes\" for attempt to resuscitate) or would you prefer a natural death (answer \"no\" for do not attempt to resuscitate)? \" yes       [] Yes   [x] No   Educated Patient / Estel Umair regarding differences between Advance Directives and portable DNR orders.     Length of ACP Conversation in minutes: Conversation Outcomes:  [x] ACP discussion completed  [] Existing advance directive reviewed with patient; no changes to patient's previously recorded wishes  [] New Advance Directive completed  [] Portable Do Not Rescitate prepared for Provider review and signature  [] POLST/POST/MOLST/MOST prepared for Provider review and signature      Follow-up plan:    [] Schedule follow-up conversation to continue planning  [] Referred individual to Provider for additional questions/concerns   [] Advised patient/agent/surrogate to review completed ACP document and update if needed with changes in condition, patient preferences or care setting    [x] This note routed to one or more involved healthcare providers

## 2021-11-26 NOTE — ED NOTES
Assumed patient care, patient in bed resting with no distress. Patients wife at the bedside. Patient is waiting to speak to hospitalist for potential admission. Patient is on 4L oxygen nc. Will continue to monitor.       Jennifer Camejo RN  11/25/21 2020

## 2021-11-27 PROCEDURE — 2500000003 HC RX 250 WO HCPCS: Performed by: INTERNAL MEDICINE

## 2021-11-27 PROCEDURE — 6360000002 HC RX W HCPCS: Performed by: INTERNAL MEDICINE

## 2021-11-27 PROCEDURE — 2700000000 HC OXYGEN THERAPY PER DAY

## 2021-11-27 PROCEDURE — 2580000003 HC RX 258: Performed by: INTERNAL MEDICINE

## 2021-11-27 PROCEDURE — 6370000000 HC RX 637 (ALT 250 FOR IP): Performed by: NURSE PRACTITIONER

## 2021-11-27 PROCEDURE — 2580000003 HC RX 258: Performed by: NURSE PRACTITIONER

## 2021-11-27 PROCEDURE — XW033H5 INTRODUCTION OF TOCILIZUMAB INTO PERIPHERAL VEIN, PERCUTANEOUS APPROACH, NEW TECHNOLOGY GROUP 5: ICD-10-PCS | Performed by: INTERNAL MEDICINE

## 2021-11-27 PROCEDURE — 6360000002 HC RX W HCPCS: Performed by: NURSE PRACTITIONER

## 2021-11-27 PROCEDURE — 6360000002 HC RX W HCPCS: Performed by: FAMILY MEDICINE

## 2021-11-27 PROCEDURE — 6370000000 HC RX 637 (ALT 250 FOR IP): Performed by: INTERNAL MEDICINE

## 2021-11-27 PROCEDURE — 99255 IP/OBS CONSLTJ NEW/EST HI 80: CPT | Performed by: INTERNAL MEDICINE

## 2021-11-27 PROCEDURE — 2140000000 HC CCU INTERMEDIATE R&B

## 2021-11-27 RX ORDER — DEXAMETHASONE SODIUM PHOSPHATE 10 MG/ML
10 INJECTION, SOLUTION INTRAMUSCULAR; INTRAVENOUS EVERY 12 HOURS
Status: DISCONTINUED | OUTPATIENT
Start: 2021-11-27 | End: 2021-11-29

## 2021-11-27 RX ORDER — LOSARTAN POTASSIUM 50 MG/1
100 TABLET ORAL DAILY
Status: DISCONTINUED | OUTPATIENT
Start: 2021-11-27 | End: 2021-12-02 | Stop reason: HOSPADM

## 2021-11-27 RX ORDER — HYDROCHLOROTHIAZIDE 12.5 MG/1
25 TABLET ORAL DAILY
Status: DISCONTINUED | OUTPATIENT
Start: 2021-11-27 | End: 2021-12-02 | Stop reason: HOSPADM

## 2021-11-27 RX ORDER — ACETAMINOPHEN 500 MG
500 TABLET ORAL EVERY 6 HOURS PRN
Status: DISCONTINUED | OUTPATIENT
Start: 2021-11-27 | End: 2021-12-02 | Stop reason: HOSPADM

## 2021-11-27 RX ORDER — LOSARTAN POTASSIUM AND HYDROCHLOROTHIAZIDE 25; 100 MG/1; MG/1
1 TABLET ORAL DAILY
Status: DISCONTINUED | OUTPATIENT
Start: 2021-11-27 | End: 2021-11-27 | Stop reason: CLARIF

## 2021-11-27 RX ADMIN — DEXAMETHASONE SODIUM PHOSPHATE 6 MG: 10 INJECTION INTRAMUSCULAR; INTRAVENOUS at 08:11

## 2021-11-27 RX ADMIN — LOSARTAN POTASSIUM 100 MG: 50 TABLET, FILM COATED ORAL at 11:10

## 2021-11-27 RX ADMIN — ALBUTEROL SULFATE 2 PUFF: 108 AEROSOL, METERED RESPIRATORY (INHALATION) at 20:19

## 2021-11-27 RX ADMIN — WATER 1000 MG: 1 INJECTION INTRAMUSCULAR; INTRAVENOUS; SUBCUTANEOUS at 14:57

## 2021-11-27 RX ADMIN — TOCILIZUMAB 810 MG: 180 INJECTION, SOLUTION SUBCUTANEOUS at 21:08

## 2021-11-27 RX ADMIN — ALBUTEROL SULFATE 2 PUFF: 108 AEROSOL, METERED RESPIRATORY (INHALATION) at 01:53

## 2021-11-27 RX ADMIN — Medication 50 MG: at 08:11

## 2021-11-27 RX ADMIN — Medication 10 ML: at 08:12

## 2021-11-27 RX ADMIN — OXYCODONE HYDROCHLORIDE AND ACETAMINOPHEN 500 MG: 500 TABLET ORAL at 08:11

## 2021-11-27 RX ADMIN — Medication 10 ML: at 20:19

## 2021-11-27 RX ADMIN — ALBUTEROL SULFATE 2 PUFF: 108 AEROSOL, METERED RESPIRATORY (INHALATION) at 14:57

## 2021-11-27 RX ADMIN — APIXABAN 5 MG: 5 TABLET, FILM COATED ORAL at 20:19

## 2021-11-27 RX ADMIN — ALBUTEROL SULFATE 2 PUFF: 108 AEROSOL, METERED RESPIRATORY (INHALATION) at 08:13

## 2021-11-27 RX ADMIN — APIXABAN 5 MG: 5 TABLET, FILM COATED ORAL at 08:11

## 2021-11-27 RX ADMIN — DEXAMETHASONE SODIUM PHOSPHATE 10 MG: 10 INJECTION, SOLUTION INTRAMUSCULAR; INTRAVENOUS at 20:19

## 2021-11-27 RX ADMIN — REMDESIVIR 100 MG: 100 INJECTION, POWDER, LYOPHILIZED, FOR SOLUTION INTRAVENOUS at 20:18

## 2021-11-27 RX ADMIN — HYDROCHLOROTHIAZIDE 25 MG: 12.5 TABLET ORAL at 11:10

## 2021-11-27 ASSESSMENT — PAIN SCALES - GENERAL
PAINLEVEL_OUTOF10: 0

## 2021-11-27 NOTE — PROGRESS NOTES
Subjective: The patient is awake and alert. Patient's O2 demand increased currently on 15 L high flow, and 100% nonrebreather  Denies chest pain, angina,     Objective:    BP (!) 141/79   Pulse 91   Temp 97.8 °F (36.6 °C) (Oral)   Resp 28   Ht 6' (1.829 m)   Wt 214 lb 11.2 oz (97.4 kg)   SpO2 92%   BMI 29.12 kg/m²     In: -   Out: 1700   In: -   Out: 1700 [Urine:1700]    General appearance: NAD, conversant, ill-appearing  HEENT: AT/NC, MMM  Neck: FROM, supple  Lungs: Diminished throughout, 15 L high flow 100% nonrebreather nonproductive cough  CV: RRR, no MRGs  Vasc: Radial pulses 2+  Abdomen: Soft, non-tender; no masses or HSM  Extremities: No peripheral edema or digital cyanosis  Skin: no rash, lesions or ulcers  Psych: Alert and oriented to person, place and time  Neuro: Alert and interactive     Recent Labs     11/25/21  1400 11/26/21  0858   WBC 5.1 6.9   HGB 14.4 13.9   HCT 41.4 40.2    152       Recent Labs     11/25/21  1400 11/26/21  0858    135   K 3.2* 3.6   CL 98 101   CO2 23 22   BUN 23 23   CREATININE 1.3* 1.2   CALCIUM 8.1* 8.3*       Assessment:    Principal Problem:    Acute respiratory failure due to COVID-19 [facility])  Active Problems:    History of DVT (deep vein thrombosis)    Hypoxia    Primary hypertension    Elevated serum creatinine    Syncope  Resolved Problems:    * No resolved hospital problems.  *      Plan:  41-year-old male with a past medical history of hypertension and DVTs admitted to telemetry unit with     Covid  -Barcitinib 4 mg   -Decadron 10 mg iv bid  -Vitamin D, zinc, vitamin C  -Monitor O2 saturations and supplement oxygen to keep saturations greater than 93%, wean as necessary, incentive spirometer, no nebulizers  -Droplet plus isolation  -Encourage ISP use  -Follow inflammatory markers-CRP 3.3  -Educated on self proning    DVT Prophylaxis   PT/OT  Discharge planning     RADHA Martinez - KAMRON  11:47 AM  11/27/2021     Above note edited to reflect my thoughts   On 15 l plus nrb   Feels better today   Resume home meds     I personally saw, examined and provided care for the patient. Radiographs, labs and medication list were reviewed by me independently. The case was discussed in detail and plans for care were established. Review of Shira GARCIA- CNP, documentation was conducted and revisions were made as appropriate directly by me. I agree with the above documented exam, problem list, and plan of care.      Marga Colón MD  8:23 PM  11/27/2021

## 2021-11-27 NOTE — CONSULTS
Issue  Department of Internal Medicine  Division of Pulmonary, Critical Care and Sleep Medicine  Consult Note    Nathanael Lopes DO, MPH, [facility], [address] MD [place], [address] Jacobs DO, [facility]      Patient: Bernice Weathers  MRN: [id number]  : 1955    Encounter Time: 12:45 PM     Date of Admission: 2021  1:45 PM    Primary Care Physician: Nicole Pena MD    Reason for Consultation: COVID and Increase Oxygen needs. Request from family. HISTORY OF PRESENT ILLNESS : Bernice Weathers 77 y.o. male was seen in consultation regarding the above chief compliant. Patient is a 70-year-old male with a past medical history of an acute DVT, and hypertension. Patient presented to the ED with complaints of shortness of breath cough and congestion x4 days. Patient states as he was cooking yesterday he also became very dizzy and weak. Patient states he was diagnosed with an acute DVT 1 month ago and was started on Eliquis and has a follow-up appointment with vascular. Patient's DVT was provoked from airplane ride out [facility].  Patient stated his shortness of breath worsened with exertion and relieved with rest.  Patient thought he just had a cold and he went to see his PCP and he was started on azithromycin. Patient is not vaccinated for COVID-19.     Patient is alert and oriented on examination. Patient denies any chest pain, fever, chills, abdominal pain, and headache. Patient is currently requiring 7 L O2 for saturation of 91%. Patient does not have a history of smoking. ER work-up reveals elevated CRP of 3.3, lactic acid 2.5, and Covid positive. Patient is admitted for further testing and treatment.  Over the next 24 hours the patients oxygen requirements increase and family requested pulmonary assessment    PAST MEDICAL HISTORY:  has a past medical history of Acute deep vein thrombosis (DVT) of femoral vein of right lower extremity ([address] DVT of leg (deep venous thrombosis) ([facility] [address] Hypertension, Leg swelling, Superficial phlebitis of right leg, and Varicose veins of both lower extremities with pain. SURGICAL HISTORY:  has a past surgical history that includes hernia repair (Left); Colonoscopy; and [place] tooth extraction. SOCIAL HISTORY:  reports that he has never smoked. He has never used smokeless tobacco. He reports previous alcohol use. He reports that he does not use drugs. FAMILY  HISTORY: family history includes High Blood Pressure in his father; Other in his father and mother. MEDICATIONS:    Prior to Admission medications    Medication Sig Start Date End Date Taking? Authorizing Provider   acetaminophen (TYLENOL) 500 MG tablet Take 500 mg by mouth every 6 hours as needed for Pain   Yes Historical Provider, MD   diphenhydrAMINE-APAP, sleep, (TYLENOL PM EXTRA STRENGTH)  MG tablet Take 1 tablet by mouth nightly as needed for Sleep   Yes Historical Provider, MD   DM-APAP-CPM (CORICIDIN HBP) -2 MG TABS Take 1 tablet by mouth 3 times daily as needed    Yes Historical Provider, MD   azithromycin (ZITHROMAX Z-ANTHONY) 250 MG tablet Take by mouth daily  11/23/21 11/27/21 Yes Historical Provider, MD   losartan-hydroCHLOROthiazide (HYZAAR) 100-25 MG per tablet daily 7/17/21  Yes Historical Provider, MD   ELIQUIS 5 MG TABS tablet Take 5 mg by mouth 2 times daily 7/29/21  Yes Historical Provider, MD       ALLERGIES: Patient has no known allergies. REVIEW OF SYSTEMS:  Otherwise negative if not reported or listed below  Constitutional:  No unanticipated weight loss. No change in sleep pattern or activity. No fevers, chills or rigors. Eyes:    No visual changes or diplopia. No scleral icterus. ENT:    No Headaches, hearing loss or vertigo. No mouth sores or sore throat. Cardiovascular:  + chest discomfort, palpitations. Respiratory:  + cough, No wheezing      No sputum production.       No hemoptysis, pleuritic pain. Gastrointestinal:  No abdominal pain, appetite loss, blood in stools. No hematemesis  Genitourinary:  No dysuria, trouble voiding or hematuria. No nocturia. Musculoskeletal:   No weakness or joint complaints. Integumentary: No rashes or pruritis. Neurological:  No headache, numbness or tingling. Psychiatric:   No effect on mood, memory, mentation, or behavior. No anxiety or depression. Endocrine:   No excessive thirst, fluid intake, or urination. No tremor. Hematologic: No abnormal bruising or bleeding. Lymphatic:  No swollen lymph nodes. Immunologic:  No hives or hx of anaphaxsis. OBJECTIVE:     PHYSICAL EXAM:   VITALS:   Vitals:    11/27/21 0551 11/27/21 0646 11/27/21 0800 11/27/21 1000   BP:   (!) 141/79    Pulse:   91    Resp:   (!) 32 28   Temp:   97.8 °F (36.6 °C)    TempSrc:   Oral    SpO2:   91% 92%   Weight: 222 lb 3.2 oz (100.8 kg) 214 lb 11.2 oz (97.4 kg)     Height: 6' (1.829 m)           Intake/Output Summary (Last 24 hours) at 11/27/2021 1245  Last data filed at 11/27/2021 0817  Gross per 24 hour   Intake --   Output 1700 ml   Net -1700 ml        CONSTITUTIONAL:   A&O x 3, NAD  SKIN:     No rash, No suspicious lesions or skin discoloration  HEENT:     EOMI, MMM, No thrush  NECK:    No bruits, No JVP apprechiated  CV:      Sinus,  No murmur, No rubs, No gallops  PULMONARY:   Couse BS,  No Wheezing, No Rales, No Rhonchi      No noted egophony  ABDOMEN:     Soft, non-tender. BS normal. No R/R/G  EXT:    No deformities . No clubbing. No lower extremity edema, No venous stasis  PULSE:   Appears equal and palpable.   PSYCHIATRIC:  Seems appropriate, No acute psycosis  MS:    No fractures, No gross weakness  NEUROLOGIC:   The clinical assessment is non-focal     DATA: IMAGING & TESTING:     LABORATORY TESTS:    CBC:   Lab Results   Component Value Date    WBC 6.9 11/26/2021    RBC 4.71 11/26/2021    HGB 13.9 11/26/2021    HCT 40.2 11/26/2021    MCV 85.4 11/26/2021    MCH 29.5 11/26/2021    MCHC 34.6 11/26/2021    RDW 12.4 11/26/2021     11/26/2021    MPV 8.9 11/26/2021     CMP:    Lab Results   Component Value Date     11/26/2021    K 3.6 11/26/2021     11/26/2021    CO2 22 11/26/2021    BUN 23 11/26/2021    CREATININE 1.2 11/26/2021    GFRAA >60 11/26/2021    LABGLOM >60 11/26/2021    GLUCOSE 169 11/26/2021    PROT 6.9 11/25/2021    LABALBU 3.5 11/25/2021    CALCIUM 8.3 11/26/2021    BILITOT 0.6 11/25/2021    ALKPHOS 54 11/25/2021    AST 31 11/25/2021    ALT 23 11/25/2021     Magnesium:    Lab Results   Component Value Date    MG 1.9 11/25/2021     ABG:  No results found for: PH, PCO2, PO2, HCO3, BE, THGB, TCO2, O2SAT  FERRITIN:    Lab Results   Component Value Date    FERRITIN 479 11/26/2021     Fibrinogen Level:  No components found for: FIB     PRO-BNP:   Lab Results   Component Value Date    PROBNP 74 11/25/2021      ABGs: No results found for: PH, PO2, PCO2  Hemoglobin A1C: No components found for: HGBA1C    IMAGING:  Imaging tests were completed and reviewed and discussed radiology and care team involved and reveals   XR CHEST PORTABLE    Result Date: 11/25/2021  EXAMINATION: ONE XRAY VIEW OF THE CHEST 11/25/2021 14:38 COMPARISON: None available to me at the time of this interpretation. HISTORY: ORDERING SYSTEM PROVIDED HISTORY: hyspxia TECHNOLOGIST PROVIDED HISTORY: Reason for exam:->hyspxia What reading provider will be dictating this exam?->CRC FINDINGS: This exam is slightly limited by patient body habitus, as well as AP portable, semi-upright technique. Given these factors: Multiple cardiac monitoring leads and respiratory support devices overlie the patient's chest.  A metallic chain encircles the patient's neck. There is minimal/mild bibasilar subsegmental atelectasis, and equivalent bilateral pulmonary hypoinflation/elevation of the hemidiaphragms, all overall slightly worse on the right.  Heart size and pulmonary vascularity are top-normal in prominence, partially artifactually-accentuated by the above processes. Osseous structures appear grossly unremarkable. No other plain radiographic abnormalities are noted. .     1. Slightly limited exam 2. Minimal/mild bibasilar subsegmental atelectasis, and equivalent bilateral pulmonary hypoinflation/elevation of the hemidiaphragms, all overall slightly worse on the right. . RECOMMENDATION: 1. Recommend follow-up thoracic imaging, as directed clinically. .       Assessment:   1. Severe CoVID 19 pneumonia  2. Acute Respiratory Failure        Plan:   1. Remdesivir for 5 days  2. Add TOCI now  3. Add rocephin and check procalcitonin  4. Check Sputum C& S  5. Monitor inflammatory profile  6. Bronchodilators   7. Maintain anticoagulation  8.  No Beri with clot history         Jan Poe DO DO, MPH, FCCP, [facility]  Professor of Internal Medicine  Pulmonary, Critical Care and Sleep Medicine

## 2021-11-27 NOTE — PLAN OF CARE
1930 Report from [address] Assessment complete, patient on levo for SBP goal >100, patient requesting Miralax because he normally takes it at night, but per dayshift nurse he had two large loose BMs today, advised patient to hold off on miralax for now 
0000 Reassessment 
0400 Reassessment, patient complaining of slight earache 
0555 Lab called, blood sugar low, 52. Rechecked finger stick, treated with 250 bolus of D10.  Recheck was in the 120s 
0720 Report given to [address] [place] [address] RN 
 Patient educated on viral infection, methods of monitoring, treatments and interventions. Patient verbalizes understanding.

## 2021-11-27 NOTE — PROGRESS NOTES
Ben served Dr Maxime Mejia per pt request, re: pt concerned he hasn't had his home blood pressure med (hyzaar)

## 2021-11-28 LAB — PROCALCITONIN: 0.16 NG/ML (ref 0–0.08)

## 2021-11-28 PROCEDURE — 6370000000 HC RX 637 (ALT 250 FOR IP): Performed by: NURSE PRACTITIONER

## 2021-11-28 PROCEDURE — 2580000003 HC RX 258: Performed by: NURSE PRACTITIONER

## 2021-11-28 PROCEDURE — 6370000000 HC RX 637 (ALT 250 FOR IP): Performed by: INTERNAL MEDICINE

## 2021-11-28 PROCEDURE — 6360000002 HC RX W HCPCS: Performed by: FAMILY MEDICINE

## 2021-11-28 PROCEDURE — 99233 SBSQ HOSP IP/OBS HIGH 50: CPT | Performed by: INTERNAL MEDICINE

## 2021-11-28 PROCEDURE — 6360000002 HC RX W HCPCS: Performed by: INTERNAL MEDICINE

## 2021-11-28 PROCEDURE — 2500000003 HC RX 250 WO HCPCS: Performed by: INTERNAL MEDICINE

## 2021-11-28 PROCEDURE — 2140000000 HC CCU INTERMEDIATE R&B

## 2021-11-28 PROCEDURE — 84145 PROCALCITONIN (PCT): CPT

## 2021-11-28 PROCEDURE — 36415 COLL VENOUS BLD VENIPUNCTURE: CPT

## 2021-11-28 PROCEDURE — 2700000000 HC OXYGEN THERAPY PER DAY

## 2021-11-28 PROCEDURE — 2580000003 HC RX 258: Performed by: INTERNAL MEDICINE

## 2021-11-28 RX ADMIN — DEXAMETHASONE SODIUM PHOSPHATE 10 MG: 10 INJECTION, SOLUTION INTRAMUSCULAR; INTRAVENOUS at 09:33

## 2021-11-28 RX ADMIN — Medication 50 MG: at 09:34

## 2021-11-28 RX ADMIN — Medication 10 ML: at 09:34

## 2021-11-28 RX ADMIN — APIXABAN 5 MG: 5 TABLET, FILM COATED ORAL at 20:46

## 2021-11-28 RX ADMIN — ALBUTEROL SULFATE 2 PUFF: 108 AEROSOL, METERED RESPIRATORY (INHALATION) at 20:48

## 2021-11-28 RX ADMIN — DEXAMETHASONE SODIUM PHOSPHATE 10 MG: 10 INJECTION, SOLUTION INTRAMUSCULAR; INTRAVENOUS at 20:46

## 2021-11-28 RX ADMIN — ALBUTEROL SULFATE 2 PUFF: 108 AEROSOL, METERED RESPIRATORY (INHALATION) at 09:35

## 2021-11-28 RX ADMIN — REMDESIVIR 100 MG: 100 INJECTION, POWDER, LYOPHILIZED, FOR SOLUTION INTRAVENOUS at 21:23

## 2021-11-28 RX ADMIN — WATER 1000 MG: 1 INJECTION INTRAMUSCULAR; INTRAVENOUS; SUBCUTANEOUS at 15:27

## 2021-11-28 RX ADMIN — ALBUTEROL SULFATE 2 PUFF: 108 AEROSOL, METERED RESPIRATORY (INHALATION) at 01:35

## 2021-11-28 RX ADMIN — Medication 10 ML: at 20:46

## 2021-11-28 RX ADMIN — HYDROCHLOROTHIAZIDE 25 MG: 12.5 TABLET ORAL at 09:34

## 2021-11-28 RX ADMIN — ALBUTEROL SULFATE 2 PUFF: 108 AEROSOL, METERED RESPIRATORY (INHALATION) at 15:28

## 2021-11-28 RX ADMIN — LOSARTAN POTASSIUM 100 MG: 50 TABLET, FILM COATED ORAL at 09:34

## 2021-11-28 RX ADMIN — OXYCODONE HYDROCHLORIDE AND ACETAMINOPHEN 500 MG: 500 TABLET ORAL at 09:34

## 2021-11-28 RX ADMIN — APIXABAN 5 MG: 5 TABLET, FILM COATED ORAL at 09:34

## 2021-11-28 ASSESSMENT — PAIN SCALES - GENERAL
PAINLEVEL_OUTOF10: 0
PAINLEVEL_OUTOF10: 0

## 2021-11-28 NOTE — PROGRESS NOTES
[facility]  Department of Internal Medicine  Division of Pulmonary, Critical Care and Sleep Medicine  Progress Note    Mao Albert DO, MPH, [facility], [place], Omi Borjas MD, [address] Nathaly Jacobs DO, [facility]      Patient: Eddie Calderon  MRN: [id number]  : 1955    Encounter Time: 2:24 PM     Date of Admission: 2021  1:45 PM    Primary Care Physician: Sam Mcbride MD    Reason for Consultation: COVID and Increase Oxygen needs. Request from family. SUBJECTIVE:    Feels good  15 liters    OBJECTIVE:     PHYSICAL EXAM:   VITALS:   Vitals:    21 1358 21 2045 21 0534 21 0815   BP:  111/61  (!) 102/59   Pulse:  87  53   Resp:    Temp:  97.5 °F (36.4 °C)  96.5 °F (35.8 °C)   TempSrc:  Temporal  Temporal   SpO2: 93% 93%  90%   Weight:   215 lb 8 oz (97.8 kg)    Height:            Intake/Output Summary (Last 24 hours) at 2021 1424  Last data filed at 2021 0534  Gross per 24 hour   Intake 320 ml   Output 900 ml   Net -580 ml        CONSTITUTIONAL:    A&O x 3, NAD  SKIN:     No rash, No discoloration  HEENT:     EOMI, MMM, No thrush  NECK:    No bruits, No JVP apprechiated  CV:      Sinus,  No murmur, No rubs, No gallops  PULMONARY:   Couse BS,  No Wheezing, No Rales, No Rhonchi      No noted egophony  ABDOMEN:     Soft, non-tender. BS normal. No R/R/G  EXT:    No deformities . No clubbing. No lower extremity edema, No venous stasis  PULSE:   Appears equal and palpable.   PSYCHIATRIC:  Seems appropriate, No acute psycosis  MS:    No fractures, No gross weakness  NEUROLOGIC:   The clinical assessment is non-focal     DATA: IMAGING & TESTING:     LABORATORY TESTS:    CBC:   Lab Results   Component Value Date    WBC 6.9 2021    RBC 4.71 2021    HGB 13.9 2021    HCT 40.2 2021    MCV 85.4 2021    MCH 29.5 2021    MCHC 34.6 2021    RDW 12.4 2021     2021 MPV 8.9 11/26/2021     CMP:    Lab Results   Component Value Date     11/26/2021    K 3.6 11/26/2021     11/26/2021    CO2 22 11/26/2021    BUN 23 11/26/2021    CREATININE 1.2 11/26/2021    GFRAA >60 11/26/2021    LABGLOM >60 11/26/2021    GLUCOSE 169 11/26/2021    PROT 6.9 11/25/2021    LABALBU 3.5 11/25/2021    CALCIUM 8.3 11/26/2021    BILITOT 0.6 11/25/2021    ALKPHOS 54 11/25/2021    AST 31 11/25/2021    ALT 23 11/25/2021     Magnesium:    Lab Results   Component Value Date    MG 1.9 11/25/2021     ABG:  No results found for: PH, PCO2, PO2, HCO3, BE, THGB, TCO2, O2SAT  FERRITIN:    Lab Results   Component Value Date    FERRITIN 479 11/26/2021     Fibrinogen Level:  No components found for: FIB     PRO-BNP:   Lab Results   Component Value Date    PROBNP 74 11/25/2021      ABGs: No results found for: PH, PO2, PCO2  Hemoglobin A1C: No components found for: HGBA1C    IMAGING:  Imaging tests were completed and reviewed and discussed radiology and care team involved and reveals   XR CHEST PORTABLE    Result Date: 11/25/2021  EXAMINATION: ONE XRAY VIEW OF THE CHEST 11/25/2021 14:38 COMPARISON: None available to me at the time of this interpretation. HISTORY: ORDERING SYSTEM PROVIDED HISTORY: hyspxia TECHNOLOGIST PROVIDED HISTORY: Reason for exam:->hyspxia What reading provider will be dictating this exam?->CRC FINDINGS: This exam is slightly limited by patient body habitus, as well as AP portable, semi-upright technique. Given these factors: Multiple cardiac monitoring leads and respiratory support devices overlie the patient's chest.  A metallic chain encircles the patient's neck. There is minimal/mild bibasilar subsegmental atelectasis, and equivalent bilateral pulmonary hypoinflation/elevation of the hemidiaphragms, all overall slightly worse on the right. Heart size and pulmonary vascularity are top-normal in prominence, partially artifactually-accentuated by the above processes.  Osseous structures appear grossly unremarkable. No other plain radiographic abnormalities are noted. .     1. Slightly limited exam 2. Minimal/mild bibasilar subsegmental atelectasis, and equivalent bilateral pulmonary hypoinflation/elevation of the hemidiaphragms, all overall slightly worse on the right. . RECOMMENDATION: 1. Recommend follow-up thoracic imaging, as directed clinically. .       Assessment:   1. Severe CoVID 19 pneumonia  2. Acute Respiratory Failure        Plan:   1. Remdesivir for 5 days  2. Add TOCI now  3. Add rocephin  4. Low check procalcitonin  5. Check Sputum C& S  6. Monitor inflammatory profile  7. Bronchodilators   8. Maintain anticoagulation  9.  No Beri with clot history         Eleonora Bermudez DO DO, MPH, FCCP, [facility]  Professor of Internal Medicine  Pulmonary, Critical Care and Sleep Medicine

## 2021-11-28 NOTE — PROGRESS NOTES
Davina Clark MD   Physician   Internal Medicine   Progress Notes      Signed   Date of Service:  11/28/2021 11:46 AM                          Subjective:     The patient is awake and alert. Sitting in chair at bedside. Looks great  Patient's O2 demand increased currently on 15 L high flow  Patient admits he feels great! Denies chest pain, angina,      Objective:     BP (!) 102/59   Pulse 53   Temp 96.5 °F (35.8 °C) (Temporal)   Resp 22   Ht 6' (1.829 m)   Wt 215 lb 8 oz (97.8 kg)   SpO2 90%   BMI 29.23 kg/m²      In: 320 [P.O.:320]  Out: 900   In: 320   Out: 900 [Urine:900]     General appearance: NAD, conversant, looks a lot better today  HEENT: AT/NC, MMM  Neck: FROM, supple  Lungs: Diminished throughout, 15 L high flow nonrebreather nonproductive cough  CV: RRR, no MRGs  Vasc: Radial pulses 2+  Abdomen: Soft, non-tender; no masses or HSM  Extremities: No peripheral edema or digital cyanosis  Skin: no rash, lesions or ulcers  Psych: Alert and oriented to person, place and time  Neuro: Alert and interactive          Recent Labs     11/25/21  1400 11/26/21  0858   WBC 5.1 6.9   HGB 14.4 13.9   HCT 41.4 40.2    152              Recent Labs     11/25/21  1400 11/26/21  0858    135   K 3.2* 3.6   CL 98 101   CO2 23 22   BUN 23 23   CREATININE 1.3* 1.2   CALCIUM 8.1* 8.3*         Assessment:     Principal Problem:    Acute respiratory failure due to COVID-19 [facility])  Active Problems:    History of DVT (deep vein thrombosis)    Hypoxia    Primary hypertension    Elevated serum creatinine    Syncope  Resolved Problems:    * No resolved hospital problems.  *        Plan:  26-year-old male with a past medical history of hypertension and DVTs admitted to telemetry unit with     Covid  -Barcitinib 4 mg - discontinued add remdesivir/rocephin  -Toci given 11/27/2021  -Decadron 10 mg iv bid  -Vitamin D, zinc, vitamin C  -Monitor O2 saturations and supplement oxygen to keep saturations greater than 93%, wean as necessary, incentive spirometer, no nebulizer's, patient requiring 15 liters high flow  -Droplet plus isolation  -Encourage ISP use  -Follow inflammatory markers-CRP 3.3 procal 0.16  -Other labs pending  -Educated on self proning     DVT Prophylaxis   PT/OT  Discharge planning      RADHA Herrera - CNP  11:46 AM  11/28/2021      Above note edited to reflect my thoughts   Agree with discontinuation of baricitinib given history of right lower extremity DVT  Continue with remdesivir, status post tocilizumab yesterday  Feels well-90% on 14 L high flow     I personally saw, examined and provided care for the patient. Radiographs, labs and medication list were reviewed by me independently. The case was discussed in detail and plans for care were established. Review of Shira GARCIA- CNP, documentation was conducted and revisions were made as appropriate directly by me.  I agree with the above documented exam, problem list, and plan of care.   Glenn Graves MD  3:56 PM  11/28/2021

## 2021-11-29 PROBLEM — J12.82 PNEUMONIA DUE TO COVID-19 VIRUS: Status: ACTIVE | Noted: 2021-11-29

## 2021-11-29 PROBLEM — U07.1 PNEUMONIA DUE TO COVID-19 VIRUS: Status: ACTIVE | Noted: 2021-11-29

## 2021-11-29 PROBLEM — J96.01 ACUTE RESPIRATORY FAILURE WITH HYPOXIA (HCC): Status: ACTIVE | Noted: 2021-11-25

## 2021-11-29 LAB — PROCALCITONIN: 0.09 NG/ML (ref 0–0.08)

## 2021-11-29 PROCEDURE — 36415 COLL VENOUS BLD VENIPUNCTURE: CPT

## 2021-11-29 PROCEDURE — 2700000000 HC OXYGEN THERAPY PER DAY

## 2021-11-29 PROCEDURE — 99232 SBSQ HOSP IP/OBS MODERATE 35: CPT | Performed by: INTERNAL MEDICINE

## 2021-11-29 PROCEDURE — 2580000003 HC RX 258: Performed by: INTERNAL MEDICINE

## 2021-11-29 PROCEDURE — 97530 THERAPEUTIC ACTIVITIES: CPT

## 2021-11-29 PROCEDURE — 6360000002 HC RX W HCPCS: Performed by: INTERNAL MEDICINE

## 2021-11-29 PROCEDURE — 97535 SELF CARE MNGMENT TRAINING: CPT

## 2021-11-29 PROCEDURE — 2580000003 HC RX 258: Performed by: NURSE PRACTITIONER

## 2021-11-29 PROCEDURE — 84145 PROCALCITONIN (PCT): CPT

## 2021-11-29 PROCEDURE — 6370000000 HC RX 637 (ALT 250 FOR IP): Performed by: INTERNAL MEDICINE

## 2021-11-29 PROCEDURE — 97161 PT EVAL LOW COMPLEX 20 MIN: CPT

## 2021-11-29 PROCEDURE — 6370000000 HC RX 637 (ALT 250 FOR IP): Performed by: NURSE PRACTITIONER

## 2021-11-29 PROCEDURE — 97165 OT EVAL LOW COMPLEX 30 MIN: CPT

## 2021-11-29 PROCEDURE — 2140000000 HC CCU INTERMEDIATE R&B

## 2021-11-29 RX ORDER — DEXAMETHASONE SODIUM PHOSPHATE 10 MG/ML
6 INJECTION, SOLUTION INTRAMUSCULAR; INTRAVENOUS EVERY 24 HOURS
Status: DISCONTINUED | OUTPATIENT
Start: 2021-11-29 | End: 2021-12-02 | Stop reason: HOSPADM

## 2021-11-29 RX ORDER — BUDESONIDE AND FORMOTEROL FUMARATE DIHYDRATE 160; 4.5 UG/1; UG/1
2 AEROSOL RESPIRATORY (INHALATION) 2 TIMES DAILY
Status: DISCONTINUED | OUTPATIENT
Start: 2021-11-29 | End: 2021-12-02 | Stop reason: HOSPADM

## 2021-11-29 RX ADMIN — APIXABAN 5 MG: 5 TABLET, FILM COATED ORAL at 09:18

## 2021-11-29 RX ADMIN — APIXABAN 5 MG: 5 TABLET, FILM COATED ORAL at 21:05

## 2021-11-29 RX ADMIN — NYSTATIN 500000 UNITS: 100000 SUSPENSION ORAL at 21:05

## 2021-11-29 RX ADMIN — ALBUTEROL SULFATE 2 PUFF: 108 AEROSOL, METERED RESPIRATORY (INHALATION) at 01:15

## 2021-11-29 RX ADMIN — Medication 10 ML: at 09:19

## 2021-11-29 RX ADMIN — DEXAMETHASONE SODIUM PHOSPHATE 6 MG: 10 INJECTION, SOLUTION INTRAMUSCULAR; INTRAVENOUS at 21:05

## 2021-11-29 RX ADMIN — Medication 10 ML: at 21:05

## 2021-11-29 RX ADMIN — HYDROCHLOROTHIAZIDE 25 MG: 12.5 TABLET ORAL at 09:18

## 2021-11-29 RX ADMIN — WATER 1000 MG: 1 INJECTION INTRAMUSCULAR; INTRAVENOUS; SUBCUTANEOUS at 12:04

## 2021-11-29 RX ADMIN — BUDESONIDE AND FORMOTEROL FUMARATE DIHYDRATE 2 PUFF: 160; 4.5 AEROSOL RESPIRATORY (INHALATION) at 21:06

## 2021-11-29 RX ADMIN — ALBUTEROL SULFATE 2 PUFF: 108 AEROSOL, METERED RESPIRATORY (INHALATION) at 09:18

## 2021-11-29 RX ADMIN — OXYCODONE HYDROCHLORIDE AND ACETAMINOPHEN 500 MG: 500 TABLET ORAL at 09:19

## 2021-11-29 RX ADMIN — LOSARTAN POTASSIUM 100 MG: 50 TABLET, FILM COATED ORAL at 09:18

## 2021-11-29 RX ADMIN — Medication 50 MG: at 09:18

## 2021-11-29 RX ADMIN — BUDESONIDE AND FORMOTEROL FUMARATE DIHYDRATE 2 PUFF: 160; 4.5 AEROSOL RESPIRATORY (INHALATION) at 12:01

## 2021-11-29 ASSESSMENT — PAIN SCALES - GENERAL
PAINLEVEL_OUTOF10: 0

## 2021-11-29 NOTE — PROGRESS NOTES
Assessment as charted. Patient requiring 12L HHF. Lungs diminished with rhonchi. Moist productive cough. BM today. No complaints offered at this time. 1230 Patient weaned down two more liters to 10L. Tolerating well. Encouraged the use of I/S and proning if possible. 1500 Prior assessment unchanged. Patient weaned down two more liters to 8L HFNC.    1610 Pt declined dinner at this time stating he does not eat like this regularly. Patient did ask to save tray for later at the bedside.

## 2021-11-29 NOTE — PROGRESS NOTES
[place]  Department of Internal Medicine  Division of Pulmonary, Critical Care and Sleep Medicine  Progress Note    Skyla Moy DO, MPH, [facility], [address], [place], [address]      Patient: Linda Augustin  MRN: [id number]  : 1955    Encounter Time: 3:05 PM     Date of Admission: 2021  1:45 PM    Primary Care Physician: Chris Galaviz MD    Reason for Consultation: COVID and Increase Oxygen needs. Request from family. SUBJECTIVE:    Feels good  Down to 10 liters    OBJECTIVE:     PHYSICAL EXAM:   VITALS:   Vitals:    21 2030 21 0530 21 0742 21 1205   BP: (!) 95/50 (!) 104/59 115/62 110/65   Pulse: 56 65 52 61   Resp: 20 18 16 16   Temp: 97.2 °F (36.2 °C) 96.7 °F (35.9 °C) 97.4 °F (36.3 °C) 97 °F (36.1 °C)   TempSrc: Infrared Temporal Temporal Temporal   SpO2: (!) 89% 94% 92% 94%   Weight:       Height:            Intake/Output Summary (Last 24 hours) at 2021 1505  Last data filed at 2021 0546  Gross per 24 hour   Intake --   Output 1725 ml   Net -1725 ml        CONSTITUTIONAL:    A&O x 3, NAD  SKIN:     No rash, No discoloration  HEENT:     EOMI, MMM, No thrush  NECK:    No bruits, No JVP apprechiated  CV:      Sinus,  No murmur, No rubs, No gallops  PULMONARY:   Couse BS,  No Wheezing, No Rales, No Rhonchi      No noted egophony  ABDOMEN:     Soft, non-tender. BS normal. No R/R/G  EXT:    No deformities . No clubbing. No lower extremity edema, No venous stasis  PULSE:   Appears equal and palpable.   PSYCHIATRIC:  Seems appropriate, No acute psycosis  MS:    No fractures, No gross weakness  NEUROLOGIC:   The clinical assessment is non-focal     DATA: IMAGING & TESTING:     LABORATORY TESTS:    CBC:   Lab Results   Component Value Date    WBC 6.9 2021    RBC 4.71 2021    HGB 13.9 2021    HCT 40.2 2021    MCV 85.4 2021    MCH 29.5 2021    MCHC 34.6 11/26/2021    RDW 12.4 11/26/2021     11/26/2021    MPV 8.9 11/26/2021     CMP:    Lab Results   Component Value Date     11/26/2021    K 3.6 11/26/2021     11/26/2021    CO2 22 11/26/2021    BUN 23 11/26/2021    CREATININE 1.2 11/26/2021    GFRAA >60 11/26/2021    LABGLOM >60 11/26/2021    GLUCOSE 169 11/26/2021    PROT 6.9 11/25/2021    LABALBU 3.5 11/25/2021    CALCIUM 8.3 11/26/2021    BILITOT 0.6 11/25/2021    ALKPHOS 54 11/25/2021    AST 31 11/25/2021    ALT 23 11/25/2021     Magnesium:    Lab Results   Component Value Date    MG 1.9 11/25/2021     ABG:  No results found for: PH, PCO2, PO2, HCO3, BE, THGB, TCO2, O2SAT  FERRITIN:    Lab Results   Component Value Date    FERRITIN 479 11/26/2021     Fibrinogen Level:  No components found for: FIB     PRO-BNP:   Lab Results   Component Value Date    PROBNP 74 11/25/2021      ABGs: No results found for: PH, PO2, PCO2  Hemoglobin A1C: No components found for: HGBA1C    IMAGING:  Imaging tests were completed and reviewed and discussed radiology and care team involved and reveals   XR CHEST PORTABLE    Result Date: 11/25/2021  EXAMINATION: ONE XRAY VIEW OF THE CHEST 11/25/2021 14:38 COMPARISON: None available to me at the time of this interpretation. HISTORY: ORDERING SYSTEM PROVIDED HISTORY: hyspxia TECHNOLOGIST PROVIDED HISTORY: Reason for exam:->hyspxia What reading provider will be dictating this exam?->CRC FINDINGS: This exam is slightly limited by patient body habitus, as well as AP portable, semi-upright technique. Given these factors: Multiple cardiac monitoring leads and respiratory support devices overlie the patient's chest.  A metallic chain encircles the patient's neck. There is minimal/mild bibasilar subsegmental atelectasis, and equivalent bilateral pulmonary hypoinflation/elevation of the hemidiaphragms, all overall slightly worse on the right.  Heart size and pulmonary vascularity are top-normal in prominence, partially

## 2021-11-29 NOTE — PROGRESS NOTES
Physical Therapy  Physical Therapy Initial Assessment     Name: Marcia Marrero  : 1955  MRN: [id number]      Date of Service: 2021    Evaluating PT: Mando Cisneros, PT, DPT [id number]      Room #:  4539/7711-G  Diagnosis:  Syncope and collapse [R55]  Chronic anticoagulation [Z79.01]  Acute respiratory failure due to COVID-19 ([facility] [U07.1, J96.00]  PMHx/PSHx:  HTN, DVT  Precautions:  Fall risk, Droplet Plus Isolation (COVID-19), O2, continuous pulse ox    SUBJECTIVE:    Pt lives with family in a single story condo with 3 stair(s) and 2 rail(s) to enter. Pt ambulated without AD prior to admission. OBJECTIVE:   Initial Evaluation  Date: 21 Treatment Date: Short Term/ Long Term   Goals   AM-PAC 6 Clicks 53/24     Was pt agreeable to Eval/treatment? Yes     Does pt have pain? No current complaints of pain     Bed Mobility  Rolling: NT  Supine to sit: Supervision  Sit to supine: NT  Scooting: Supervision to EOB  Rolling: Independent   Supine to sit: Independent   Sit to supine: Independent   Scooting: Independent    Transfers Sit to stand: Supervision  Stand to sit: Supervision  Stand pivot: Supervision without AD  Sit to stand: Independent   Stand to sit: Independent   Stand pivot: Independent    Ambulation   5, 5, 10 feet without AD with SBA  >100 feet Independent    Stair negotiation: ascended and descended NT  3 step(s) with 2 rail(s) Mod Independent    ROM BUE: Refer to OT note  BLE: WFL     Strength BUE: Refer to OT note  BLE: WFL     Balance Sitting EOB: Supervision  Dynamic Standing: SBA without AD  Sitting EOB: Independent   Dynamic Standing: Independent      Pt is A & O x: 4 to person, place, month/year, and situation. Sensation: Pt denies numbness and tingling of extremities. Edema: Unremarkable.     Vitals on 12 L O2/min:  Rest: O2 sat 94%  Supine to sit: O2 sat 91%  Seated ADL: O2 sat 89% (30 second delay)  Stand pivot to chair: O2 sat 90%  Short ambulation and x2 transfers: O2 sat 88%  Short ambulation and 5x STS exercise: O2 sat 87%  Seated rest in chair: O2 sat recovered to 90% in 1 minute  Conclusion of session: O2 sat 94%      Therapeutic Exercises:  5x STS from chair    Patient education  Pt educated on PT role in acute care setting. Patient response to education:   Pt verbalized understanding Pt demonstrated skill Pt requires further education in this area   Yes NA No     ASSESSMENT:    Conditions Requiring Skilled Therapeutic Intervention:    [x]Decreased strength     []Decreased ROM  [x]Decreased functional mobility  [x]Decreased balance   [x]Decreased endurance   []Decreased posture  []Decreased sensation  []Decreased coordination   []Decreased vision  []Decreased safety awareness   []Increased pain       Comments:    Pt was in bed upon room entry, agreeable to PT evaluation. Pt is very pleasant and cooperative. Pt is slightly anxious but asks good questions regarding his diagnosis and safety with all mobility. Pt has good mobility and fair activity tolerance. Pt tends to have 30 second delay in desaturation but typically recovered to WNL in 1-2 minutes of seated rest. Pt reports mild SOB with activity but symptoms also recovered with rest. Pt ambulated several times and completed multiple transfers from various surfaces. Pt was encouraged to sit up in chair multiple times throughout the day. Pt was left in chair with all needs met at conclusion of session. Treatment:  Patient practiced and was instructed in the following treatment:     Therapeutic activities:  o Transfers: Pt was cued for hand placement during sit <> stand transfers. Pt completed multiple transfers from surfaces of varying heights (EOB x1, chair x7, commode x1). o Ambulation: Pt ambulated x3 reps without AD as standing balance and endurance were challenged. o Vitals and symptoms were closely monitored throughout session during activity and rest breaks.   o Education: Pt was educated extensively on the following topics: pursed lip breathing technique, taking rest breaks/pacing himself with activity, self monitoring vitals/symptoms, normal vital signs, proning/sidelying, and importance of OOB activity/up in chair.  Therapeutic exercise: Pt completed 5x STS exercise as noted above. Pt's/family goals:  1. To return home. Prognosis is Good for reaching above PT goals. Patient and or family understand(s) diagnosis, prognosis, and plan of care. Yes. PHYSICAL THERAPY PLAN OF CARE:    PT POC is established based on physician order and patient diagnosis     Referring provider/PT Order:    Start   Ordering Provider    11/26/21 0200  PT eval and treat  Start:  11/26/21 0200,   End:  11/26/21 0200,   ONE TIME,   Standing Count:  1 Occurrences,   R         RADHA Ramirez - KAMRON        Diagnosis:  Syncope and collapse [R55]  Chronic anticoagulation [Z79.01]  Acute respiratory failure due to COVID-19 (HCC) [U07.1, J96.00]  Specific instructions for next treatment:  Increase ambulation distance. Current Treatment Recommendations:     [x] Strengthening to improve independence with functional mobility   [] ROM to improve independence with functional mobility   [x] Balance Training to improve static/dynamic balance and to reduce fall risk  [x] Endurance Training to improve activity tolerance during functional mobility   [x] Transfer Training to improve safety and independence with all functional transfers   [x] Gait Training to improve gait mechanics, endurance and assess need for appropriate assistive device  [x] Stair Training in preparation for safe discharge home and/or into the community   [x] Positioning to prevent skin breakdown and contractures  [x] Safety and Education Training   [] Patient/Caregiver Education   [] HEP  [] Other     PT long term treatment goals are located in above grid    Frequency of treatments: 2-5x/week x 3-5 days.     Time in  0940  Time out  1020    Total Treatment Time 23 minutes     Evaluation Time includes thorough review of current medical information, gathering information on past medical history/social history and prior level of function, completion of standardized testing/informal observation of tasks, assessment of data and education on plan of care and goals.     CPT codes:  [x] Low Complexity PT evaluation 19165  [] Moderate Complexity PT evaluation 48305  [] High Complexity PT evaluation 21791  [] PT Re-evaluation 63046  [] Gait training 81149 0 minutes  [] Manual therapy 16162 0 minutes  [x] Therapeutic activities 10222 23 minutes  [] Therapeutic exercises 01572 0 minutes  [] Neuromuscular reeducation 90481 0 minutes     Ree Hansen, PT, DPT  [id number]

## 2021-11-29 NOTE — PROGRESS NOTES
Occupational Therapy  OCCUPATIONAL THERAPY INITIAL EVALUATION    [address] [address] [address]      QVUU:                                                Patient Name: Tiff Layton  MRN: [id number]  : 1955  Room: [address] [place] [address]    Evaluating OT: Zora Saucedo OTR/L #[id number]     Referring Provider: RADHA Ramirez CNP  Specific Provider Orders/Date: OT eval and treat 21    Diagnosis: Syncope and collapse [R55]  Chronic anticoagulation [Z79.01]  Acute respiratory failure due to COVID-19 ([address] [U07.1, J96.00]   Pt admitted to hospital with SOB, congestion and + syncopal episode     Pertinent Medical History:  has a past medical history of Acute deep vein thrombosis (DVT) of femoral vein of right lower extremity ([address] DVT of leg (deep venous thrombosis) ([address] Hypertension, Leg swelling, Superficial phlebitis of right leg, and Varicose veins of both lower extremities with pain.        Precautions:  Fall Risk, Droplet plus (COVID-19),  12L O2 via HFNC, continuous pulse ox    Assessment of current deficits    [x] Functional mobility  [x]ADLs  [x] Strength               []Cognition    [x] Functional transfers   [x] IADLs         [x] Safety Awareness   [x]Endurance    [] Fine Coordination              [x] Balance      [] Vision/perception   []Sensation     []Gross Motor Coordination  [] ROM  [] Delirium                   [] Motor Control     OT PLAN OF CARE   OT POC based on physician orders, patient diagnosis and results of clinical assessment    Frequency/Duration 1-3 days/wk for 2 weeks PRN   Specific OT Treatment Interventions to include:   * Instruction/training on adapted ADL techniques and AE recommendations to increase functional independence within precautions       * Training on energy conservation strategies, correct breathing pattern and techniques to improve independence/tolerance for self-care routine  * conservation techniques. Skilled monitoring of HR, O2 sats and pts response to treatment (see above). Rehab Potential: Good for established goals     Patient / Family Goal: return home      Patient and/or family were instructed on functional diagnosis, prognosis/goals and OT plan of care. Demonstrated fair+ understanding. Eval Complexity: Low    Time In: 940  Time Out: 1025  Total Treatment Time: 24 minutes    Min Units   OT Eval Low 97165  x  1   OT Eval Medium 60950      OT Eval High 90796      OT Re-Eval [id number]       Therapeutic Ex 55162       Therapeutic Activities 49028  11  1   ADL/Self Care 39607  13  1   Orthotic Management 71845       Manual 15910     Neuro Re-Ed 10983       Non-Billable Time          Evaluation Time additionally includes thorough review of current medical information, gathering information on past medical history/social history and prior level of function, interpretation of standardized testing/informal observation of tasks, assessment of data and development of plan of care and goals.           Karyn Nagy OTR/L #[id number]

## 2021-11-30 LAB
BLOOD CULTURE, ROUTINE: NORMAL
C-REACTIVE PROTEIN: 1.9 MG/DL (ref 0–0.4)
CULTURE, BLOOD 2: NORMAL

## 2021-11-30 PROCEDURE — 2140000000 HC CCU INTERMEDIATE R&B

## 2021-11-30 PROCEDURE — 86140 C-REACTIVE PROTEIN: CPT

## 2021-11-30 PROCEDURE — 2580000003 HC RX 258: Performed by: NURSE PRACTITIONER

## 2021-11-30 PROCEDURE — 6370000000 HC RX 637 (ALT 250 FOR IP): Performed by: INTERNAL MEDICINE

## 2021-11-30 PROCEDURE — 6360000002 HC RX W HCPCS: Performed by: INTERNAL MEDICINE

## 2021-11-30 PROCEDURE — 6370000000 HC RX 637 (ALT 250 FOR IP): Performed by: NURSE PRACTITIONER

## 2021-11-30 PROCEDURE — 2700000000 HC OXYGEN THERAPY PER DAY

## 2021-11-30 PROCEDURE — 36415 COLL VENOUS BLD VENIPUNCTURE: CPT

## 2021-11-30 PROCEDURE — 2580000003 HC RX 258: Performed by: INTERNAL MEDICINE

## 2021-11-30 PROCEDURE — 6370000000 HC RX 637 (ALT 250 FOR IP): Performed by: PHYSICIAN ASSISTANT

## 2021-11-30 RX ADMIN — DEXAMETHASONE SODIUM PHOSPHATE 6 MG: 10 INJECTION, SOLUTION INTRAMUSCULAR; INTRAVENOUS at 20:35

## 2021-11-30 RX ADMIN — NYSTATIN 500000 UNITS: 100000 SUSPENSION ORAL at 20:34

## 2021-11-30 RX ADMIN — Medication 10 ML: at 20:36

## 2021-11-30 RX ADMIN — OXYCODONE HYDROCHLORIDE AND ACETAMINOPHEN 500 MG: 500 TABLET ORAL at 09:07

## 2021-11-30 RX ADMIN — BUDESONIDE AND FORMOTEROL FUMARATE DIHYDRATE 2 PUFF: 160; 4.5 AEROSOL RESPIRATORY (INHALATION) at 08:00

## 2021-11-30 RX ADMIN — Medication 50 MG: at 09:07

## 2021-11-30 RX ADMIN — WATER 1000 MG: 1 INJECTION INTRAMUSCULAR; INTRAVENOUS; SUBCUTANEOUS at 12:52

## 2021-11-30 RX ADMIN — Medication 10 ML: at 08:00

## 2021-11-30 RX ADMIN — NYSTATIN 500000 UNITS: 100000 SUSPENSION ORAL at 08:00

## 2021-11-30 RX ADMIN — BUDESONIDE AND FORMOTEROL FUMARATE DIHYDRATE 2 PUFF: 160; 4.5 AEROSOL RESPIRATORY (INHALATION) at 20:35

## 2021-11-30 RX ADMIN — APIXABAN 5 MG: 5 TABLET, FILM COATED ORAL at 20:35

## 2021-11-30 RX ADMIN — APIXABAN 5 MG: 5 TABLET, FILM COATED ORAL at 09:07

## 2021-11-30 RX ADMIN — Medication 3 MG: at 20:34

## 2021-11-30 ASSESSMENT — PAIN SCALES - GENERAL
PAINLEVEL_OUTOF10: 0

## 2021-11-30 NOTE — PROGRESS NOTES
Subjective:    Feeling better no complaints notes he is doing his incentive spirometer  No CP or SOB  No fever or chills   No uncontrolled pain  No vomiting or diarrhea   No events reported overnight     Objective:    BP (!) 115/58   Pulse 55   Temp 98 °F (36.7 °C) (Temporal)   Resp 18   Ht 6' (1.829 m)   Wt 215 lb 3.2 oz (97.6 kg)   SpO2 98%   BMI 29.19 kg/m²     In: 440 [P.O.:440]  Out: 900   In: 440   Out: 900 [Urine:900]    General appearance: NAD, conversant, looks a lot better today  HEENT: AT/NC, MMM  Neck: FROM, supple  Lungs: Diminished throughout, 15 L high flow nonrebreather nonproductive cough  CV: RRR, no MRGs  Vasc: Radial pulses 2+  Abdomen: Soft, non-tender; no masses or HSM  Extremities: No peripheral edema or digital cyanosis  Skin: no rash, lesions or ulcers  Psych: Alert and oriented to person, place and time  Neuro: Alert and interactive     No results for input(s): WBC, HGB, HCT, PLT in the last 72 hours. No results for input(s): NA, K, CL, CO2, BUN, CREATININE, CALCIUM in the last 72 hours. Invalid input(s): GLU    Assessment:    Principal Problem:    Pneumonia due to COVID-19 virus  Active Problems:    Acute respiratory failure with hypoxia (HCC)    History of DVT (deep vein thrombosis)    Hypoxia    Primary hypertension    Elevated serum creatinine    Syncope  Resolved Problems:    * No resolved hospital problems.  *      Plan:  79-year-old male with a past medical history of hypertension and DVTs admitted to telemetry unit with     Covid pneumonia  -Barcitinib 4 mg - discontinued   -remdesivir  -rocephin-procalcitonin low at 0.16, repeat 0.09  -Toci given 11/27/2021  -Decadron 6 mg iv bid  -Vitamin D, zinc, vitamin C  -Droplet plus isolation  -Follow inflammatory markers-CRP 3.3     Acute hypoxic respiratory failure 92% on 6 L high flow nasal cannula  -Monitor O2 saturations and supplement oxygen to keep saturations greater than 89%, wean as necessary, incentive spirometer, no nebulizer's, Encourage ISP use  Encourage proning. As needed    History of DVT  Apixaban  Monitor for evidence of bleeding or thrombosis    Hypertension-chronically -currently low BP  Hold losartan and hydrochlorothiazide  Monitor    DVT Prophylaxis   PT/OT  Discharge planning     Emma Peterson.  Yi Fuchs MD

## 2021-11-30 NOTE — CARE COORDINATION
Patient day 6 with  covid pneumonia and acute respiratory failure. Had [address] and Remdesivir that was stopped yesterday. Remain sin droplet plus isolation and is on 8L of O2 with pulse OX of 98%. Vit C, Zinc, IV Rocephin, and IV Decadron daily continued. Spoke with patient per phone regarding transition of care. His PT  Am-Pac score was 18 and Patient declining need for HHC. He asked this CM to albaro his spouse regarding DME choices, if O2 indicated at discharge. Patient will need ambulating pulse OX prior to discharge to evaluate the need for home O2. Spoke to patients spouse regarding DME choice and she had no preference of company if their insurance is a provider. CM/SW will continue to follow.

## 2021-11-30 NOTE — PROGRESS NOTES
Subjective:    Feeling better breathing improving  No CP or SOB  No fever or chills   No uncontrolled pain  No vomiting or diarrhea   No events reported overnight     Objective:    BP (!) 115/58   Pulse 55   Temp 98 °F (36.7 °C) (Temporal)   Resp 18   Ht 6' (1.829 m)   Wt 215 lb 3.2 oz (97.6 kg)   SpO2 98%   BMI 29.19 kg/m²     In: 440 [P.O.:440]  Out: 900   In: 440   Out: 900 [Urine:900]    General appearance: NAD, conversant, looks a lot better today  HEENT: AT/NC, MMM  Neck: FROM, supple  Lungs: Diminished throughout, 15 L high flow nonrebreather nonproductive cough  CV: RRR, no MRGs  Vasc: Radial pulses 2+  Abdomen: Soft, non-tender; no masses or HSM  Extremities: No peripheral edema or digital cyanosis  Skin: no rash, lesions or ulcers  Psych: Alert and oriented to person, place and time  Neuro: Alert and interactive     No results for input(s): WBC, HGB, HCT, PLT in the last 72 hours. No results for input(s): NA, K, CL, CO2, BUN, CREATININE, CALCIUM in the last 72 hours. Invalid input(s): GLU    Assessment:    Principal Problem:    Pneumonia due to COVID-19 virus  Active Problems:    Acute respiratory failure with hypoxia (HCC)    History of DVT (deep vein thrombosis)    Hypoxia    Primary hypertension    Elevated serum creatinine    Syncope  Resolved Problems:    * No resolved hospital problems.  *      Plan:  59-year-old male with a past medical history of hypertension and DVTs admitted to telemetry unit with     Covid pneumonia  -Barcitinib 4 mg - discontinued   -remdesivir  -rocephin-procalcitonin low at 0.16, repeat  -Toci given 11/27/2021  -Decadron 6 mg iv bid  -Vitamin D, zinc, vitamin C  -Droplet plus isolation  -Follow inflammatory markers-CRP 3.3     Acute hypoxic respiratory failure  -Monitor O2 saturations and supplement oxygen to keep saturations greater than 93%, wean as necessary, incentive spirometer, no nebulizer's, patient requiring 15 liters high flow-Encourage ISP

## 2021-12-01 LAB — C-REACTIVE PROTEIN: 0.9 MG/DL (ref 0–0.4)

## 2021-12-01 PROCEDURE — 99232 SBSQ HOSP IP/OBS MODERATE 35: CPT | Performed by: INTERNAL MEDICINE

## 2021-12-01 PROCEDURE — 6360000002 HC RX W HCPCS: Performed by: INTERNAL MEDICINE

## 2021-12-01 PROCEDURE — 2580000003 HC RX 258: Performed by: INTERNAL MEDICINE

## 2021-12-01 PROCEDURE — 86140 C-REACTIVE PROTEIN: CPT

## 2021-12-01 PROCEDURE — 2700000000 HC OXYGEN THERAPY PER DAY

## 2021-12-01 PROCEDURE — 2580000003 HC RX 258: Performed by: NURSE PRACTITIONER

## 2021-12-01 PROCEDURE — 6370000000 HC RX 637 (ALT 250 FOR IP): Performed by: INTERNAL MEDICINE

## 2021-12-01 PROCEDURE — 6370000000 HC RX 637 (ALT 250 FOR IP): Performed by: NURSE PRACTITIONER

## 2021-12-01 PROCEDURE — 36415 COLL VENOUS BLD VENIPUNCTURE: CPT

## 2021-12-01 PROCEDURE — 2140000000 HC CCU INTERMEDIATE R&B

## 2021-12-01 RX ORDER — DEXAMETHASONE 6 MG/1
6 TABLET ORAL
Qty: 5 TABLET | Refills: 0 | Status: SHIPPED | OUTPATIENT
Start: 2021-12-01 | End: 2021-12-06

## 2021-12-01 RX ADMIN — NYSTATIN 500000 UNITS: 100000 SUSPENSION ORAL at 09:02

## 2021-12-01 RX ADMIN — Medication 10 ML: at 22:31

## 2021-12-01 RX ADMIN — NYSTATIN 500000 UNITS: 100000 SUSPENSION ORAL at 22:30

## 2021-12-01 RX ADMIN — OXYCODONE HYDROCHLORIDE AND ACETAMINOPHEN 500 MG: 500 TABLET ORAL at 09:02

## 2021-12-01 RX ADMIN — APIXABAN 5 MG: 5 TABLET, FILM COATED ORAL at 22:31

## 2021-12-01 RX ADMIN — BUDESONIDE AND FORMOTEROL FUMARATE DIHYDRATE 2 PUFF: 160; 4.5 AEROSOL RESPIRATORY (INHALATION) at 09:03

## 2021-12-01 RX ADMIN — DEXAMETHASONE SODIUM PHOSPHATE 6 MG: 10 INJECTION, SOLUTION INTRAMUSCULAR; INTRAVENOUS at 22:31

## 2021-12-01 RX ADMIN — APIXABAN 5 MG: 5 TABLET, FILM COATED ORAL at 09:02

## 2021-12-01 RX ADMIN — WATER 1000 MG: 1 INJECTION INTRAMUSCULAR; INTRAVENOUS; SUBCUTANEOUS at 13:53

## 2021-12-01 RX ADMIN — Medication 50 MG: at 09:02

## 2021-12-01 RX ADMIN — Medication 10 ML: at 09:02

## 2021-12-01 RX ADMIN — BUDESONIDE AND FORMOTEROL FUMARATE DIHYDRATE 2 PUFF: 160; 4.5 AEROSOL RESPIRATORY (INHALATION) at 22:30

## 2021-12-01 ASSESSMENT — PAIN SCALES - GENERAL
PAINLEVEL_OUTOF10: 0

## 2021-12-01 NOTE — CARE COORDINATION
Discharge held. Ambulating pulse-ox test completed today and patient required 6L to recovery following ambulation. Spoke with the patient and he reports his wife will transport home when discharged. Patient has no preference on DME company. The Plan for Transition of Care is related to the following treatment goals: discharge planning    The Patient and/or patient representative Casandra Walker was provided with a choice of provider and agrees   with the discharge plan. [x] Yes [] No    Freedom of choice list was provided with basic dialogue that supports the patient's individualized plan of care/goals, treatment preferences and shares the quality data associated with the providers.  [x] Yes [] No    Juan Mcqueen, MSW, LSW [phone]

## 2021-12-01 NOTE — PLAN OF CARE
Problem: Airway Clearance - Ineffective  Goal: Achieve or maintain patent airway  12/1/2021 1434 by Jonathan Stout RN  Outcome: Ongoing  12/1/2021 0054 by Lala Marsh RN  Outcome: Met This Shift     Problem: Gas Exchange - Impaired  Goal: Absence of hypoxia  12/1/2021 1434 by Jonathan Stout RN  Outcome: Ongoing  12/1/2021 0054 by Lala Marsh RN  Outcome: Met This Shift  Goal: Promote optimal lung function  12/1/2021 1434 by Jonathan Stout RN  Outcome: Ongoing  12/1/2021 0054 by Lala Marsh RN  Outcome: Met This Shift     Problem: Breathing Pattern - Ineffective  Goal: Ability to achieve and maintain a regular respiratory rate  12/1/2021 1434 by Jonathan Stout RN  Outcome: Ongoing  12/1/2021 0054 by Lala Marsh RN  Outcome: Met This Shift     Problem:  Body Temperature -  Risk of, Imbalanced  Goal: Ability to maintain a body temperature within defined limits  12/1/2021 1434 by Jonathan Stout RN  Outcome: Ongoing  12/1/2021 0054 by Lala Marsh RN  Outcome: Met This Shift  Goal: Will regain or maintain usual level of consciousness  12/1/2021 1434 by Jonathan Stout RN  Outcome: Ongoing  12/1/2021 0054 by Lala Marsh RN  Outcome: Met This Shift  Goal: Complications related to the disease process, condition or treatment will be avoided or minimized  12/1/2021 1434 by Jonathan Stout RN  Outcome: Ongoing  12/1/2021 0054 by Lala Marsh RN  Outcome: Met This Shift     Problem: Isolation Precautions - Risk of Spread of Infection  Goal: Prevent transmission of infection  12/1/2021 1434 by Jonathan Stout RN  Outcome: Ongoing  12/1/2021 0054 by Lala Marsh RN  Outcome: Met This Shift     Problem: Nutrition Deficits  Goal: Optimize nutritional status  12/1/2021 1434 by Jonathan Stout RN  Outcome: Ongoing  12/1/2021 0054 by Lala Marsh RN  Outcome: Met This Shift     Problem: Risk for Fluid Volume Deficit  Goal: Maintain normal heart rhythm  12/1/2021 1434 by Lexi Carlson RN  Outcome: Ongoing  12/1/2021 0054 by Mayela Wilcox RN  Outcome: Met This Shift  Goal: Maintain absence of muscle cramping  12/1/2021 1434 by Lexi Carlson RN  Outcome: Ongoing  12/1/2021 0054 by Mayela Wilcox RN  Outcome: Met This Shift  Goal: Maintain normal serum potassium, sodium, calcium, phosphorus, and pH  12/1/2021 1434 by Lexi Carlson RN  Outcome: Ongoing  12/1/2021 0054 by Mayela Wilcox RN  Outcome: Met This Shift     Problem: Loneliness or Risk for Loneliness  Goal: Demonstrate positive use of time alone when socialization is not possible  12/1/2021 1434 by Lexi Carlson RN  Outcome: Ongoing  12/1/2021 0054 by Mayela Wilcox RN  Outcome: Met This Shift     Problem: Fatigue  Goal: Verbalize increase energy and improved vitality  12/1/2021 1434 by Lexi Carlson RN  Outcome: Ongoing  12/1/2021 0054 by Mayela Wilcox RN  Outcome: Met This Shift     Problem: Patient Education: Go to Patient Education Activity  Goal: Patient/Family Education  12/1/2021 1434 by Lexi Carlson RN  Outcome: Ongoing  12/1/2021 0054 by Mayela Wilcox RN  Outcome: Met This Shift     Problem: Falls - Risk of:  Goal: Will remain free from falls  Description: Will remain free from falls  12/1/2021 1434 by Lexi Carlson RN  Outcome: Ongoing  12/1/2021 0054 by Mayela Wilcox RN  Outcome: Met This Shift  Goal: Absence of physical injury  Description: Absence of physical injury  12/1/2021 1434 by Lexi Carlson RN  Outcome: Ongoing  12/1/2021 0054 by Mayela Wilcox RN  Outcome: Met This Shift

## 2021-12-01 NOTE — PROGRESS NOTES
40.2 11/26/2021    MCV 85.4 11/26/2021    MCH 29.5 11/26/2021    MCHC 34.6 11/26/2021    RDW 12.4 11/26/2021     11/26/2021    MPV 8.9 11/26/2021     CMP:    Lab Results   Component Value Date     11/26/2021    K 3.6 11/26/2021     11/26/2021    CO2 22 11/26/2021    BUN 23 11/26/2021    CREATININE 1.2 11/26/2021    GFRAA >60 11/26/2021    LABGLOM >60 11/26/2021    GLUCOSE 169 11/26/2021    PROT 6.9 11/25/2021    LABALBU 3.5 11/25/2021    CALCIUM 8.3 11/26/2021    BILITOT 0.6 11/25/2021    ALKPHOS 54 11/25/2021    AST 31 11/25/2021    ALT 23 11/25/2021     Magnesium:    Lab Results   Component Value Date    MG 1.9 11/25/2021     ABG:  No results found for: PH, PCO2, PO2, HCO3, BE, THGB, TCO2, O2SAT  FERRITIN:    Lab Results   Component Value Date    FERRITIN 479 11/26/2021     Fibrinogen Level:  No components found for: FIB     PRO-BNP:   Lab Results   Component Value Date    PROBNP 74 11/25/2021      ABGs: No results found for: PH, PO2, PCO2  Hemoglobin A1C: No components found for: HGBA1C    IMAGING:  Imaging tests were completed and reviewed and discussed radiology and care team involved and reveals   XR CHEST PORTABLE    Result Date: 11/25/2021  EXAMINATION: ONE XRAY VIEW OF THE CHEST 11/25/2021 14:38 COMPARISON: None available to me at the time of this interpretation. HISTORY: ORDERING SYSTEM PROVIDED HISTORY: hyspxia TECHNOLOGIST PROVIDED HISTORY: Reason for exam:->hyspxia What reading provider will be dictating this exam?->CRC FINDINGS: This exam is slightly limited by patient body habitus, as well as AP portable, semi-upright technique. Given these factors: Multiple cardiac monitoring leads and respiratory support devices overlie the patient's chest.  A metallic chain encircles the patient's neck. There is minimal/mild bibasilar subsegmental atelectasis, and equivalent bilateral pulmonary hypoinflation/elevation of the hemidiaphragms, all overall slightly worse on the right.  Heart size and pulmonary vascularity are top-normal in prominence, partially artifactually-accentuated by the above processes. Osseous structures appear grossly unremarkable. No other plain radiographic abnormalities are noted. .     1. Slightly limited exam 2. Minimal/mild bibasilar subsegmental atelectasis, and equivalent bilateral pulmonary hypoinflation/elevation of the hemidiaphragms, all overall slightly worse on the right. . RECOMMENDATION: 1. Recommend follow-up thoracic imaging, as directed clinically. .       Assessment:   1. Severe CoVID 19 pneumonia  2. Acute Respiratory Failure  3. Acute tracheobronchitis        Plan:   1. Remdesivir for 5 days total   2. Was given TOCI  3. Add rocephin total of 5 days/finished   4. Monitor inflammatory profile  5. Bronchodilators Q12  6. Maintain anticoagulation  7. No Beri with clot history   8. Continue PT OT  9. Wean oxygen and no discharge till off oxygen  10.          Andrew Weems DO DO, MPH, Marivel Phillips  Professor of Internal Medicine  Pulmonary, Critical Care and Sleep Medicine

## 2021-12-01 NOTE — PROGRESS NOTES
Subjective:    Feeling okay without complaint   no CP or SOB  No fever or chills   No uncontrolled pain  No vomiting or diarrhea   No events reported overnight     Objective:    /70   Pulse 71   Temp 96.7 °F (35.9 °C) (Temporal)   Resp 18   Ht 6' (1.829 m)   Wt 215 lb 9.6 oz (97.8 kg)   SpO2 94%   BMI 29.24 kg/m²     In: -   Out: 1400   In: -   Out: 1400 [Urine:1400]    General appearance: NAD, conversant, looks a lot better today  HEENT: AT/NC, MMM  Neck: FROM, supple  Lungs: Diminished throughout, 15 L high flow nonrebreather nonproductive cough  CV: RRR, no MRGs  Vasc: Radial pulses 2+  Abdomen: Soft, non-tender; no masses or HSM  Extremities: No peripheral edema or digital cyanosis  Skin: no rash, lesions or ulcers  Psych: Alert and oriented to person, place and time  Neuro: Alert and interactive     No results for input(s): WBC, HGB, HCT, PLT in the last 72 hours. No results for input(s): NA, K, CL, CO2, BUN, CREATININE, CALCIUM in the last 72 hours. Invalid input(s): GLU    Assessment:    Principal Problem:    Pneumonia due to COVID-19 virus  Active Problems:    Acute respiratory failure with hypoxia (HCC)    History of DVT (deep vein thrombosis)    Hypoxia    Primary hypertension    Elevated serum creatinine    Syncope  Resolved Problems:    * No resolved hospital problems. *      Plan:  63-year-old male with a past medical history of hypertension and DVTs admitted to telemetry unit with     Covid pneumonia  -Barcitinib 4 mg - discontinued   -remdesivir  -rocephin-procalcitonin low at 0.16, repeat 0.09  -Toci given 11/27/2021  -Decadron 6 mg iv bid  -Vitamin D, zinc, vitamin C  -Droplet plus isolation  -Follow inflammatory markers-CRP 3.3     Acute hypoxic respiratory failure 92% on 4 nasal cannula  -Monitor O2 saturations and supplement oxygen to keep saturations greater than 89%, wean as necessary, incentive spirometer, no nebulizer's, Encourage ISP use  Encourage proning.   As needed    History of DVT  Apixaban  Monitor for evidence of bleeding or thrombosis    Hypertension-chronically -currently low BP  Hold losartan and hydrochlorothiazide  Monitor    DVT Prophylaxis   PT/OT  Discharge planning DC home plus or minus home O2 24 to 48 hours     Trevor Burch MD

## 2021-12-01 NOTE — PROGRESS NOTES
Pulse ox on room air sitting 84%  Pulse ox on room air ambulating 83%  Pulse ox on 4 liters sitting recovery 90%  Pulse ox on 6 liters ambulating recovery 90%

## 2021-12-02 VITALS
HEART RATE: 65 BPM | OXYGEN SATURATION: 92 % | BODY MASS INDEX: 29.2 KG/M2 | RESPIRATION RATE: 20 BRPM | SYSTOLIC BLOOD PRESSURE: 125 MMHG | HEIGHT: 72 IN | WEIGHT: 215.6 LBS | DIASTOLIC BLOOD PRESSURE: 75 MMHG | TEMPERATURE: 98.1 F

## 2021-12-02 LAB
ANION GAP SERPL CALCULATED.3IONS-SCNC: 12 MMOL/L (ref 7–16)
BUN BLDV-MCNC: 32 MG/DL (ref 6–23)
C-REACTIVE PROTEIN: 0.5 MG/DL (ref 0–0.4)
CALCIUM SERPL-MCNC: 8.6 MG/DL (ref 8.6–10.2)
CHLORIDE BLD-SCNC: 105 MMOL/L (ref 98–107)
CO2: 24 MMOL/L (ref 22–29)
CREAT SERPL-MCNC: 1.2 MG/DL (ref 0.7–1.2)
GFR AFRICAN AMERICAN: >60
GFR NON-AFRICAN AMERICAN: >60 ML/MIN/1.73
GLUCOSE BLD-MCNC: 128 MG/DL (ref 74–99)
HCT VFR BLD CALC: 43.8 % (ref 37–54)
HEMOGLOBIN: 14.9 G/DL (ref 12.5–16.5)
MCH RBC QN AUTO: 28.7 PG (ref 26–35)
MCHC RBC AUTO-ENTMCNC: 34 % (ref 32–34.5)
MCV RBC AUTO: 84.4 FL (ref 80–99.9)
PDW BLD-RTO: 12.5 FL (ref 11.5–15)
PLATELET # BLD: 223 E9/L (ref 130–450)
PMV BLD AUTO: 9.1 FL (ref 7–12)
POTASSIUM SERPL-SCNC: 4.9 MMOL/L (ref 3.5–5)
RBC # BLD: 5.19 E12/L (ref 3.8–5.8)
SODIUM BLD-SCNC: 141 MMOL/L (ref 132–146)
WBC # BLD: 5.5 E9/L (ref 4.5–11.5)

## 2021-12-02 PROCEDURE — 99233 SBSQ HOSP IP/OBS HIGH 50: CPT | Performed by: INTERNAL MEDICINE

## 2021-12-02 PROCEDURE — 2700000000 HC OXYGEN THERAPY PER DAY

## 2021-12-02 PROCEDURE — 2580000003 HC RX 258: Performed by: NURSE PRACTITIONER

## 2021-12-02 PROCEDURE — 6370000000 HC RX 637 (ALT 250 FOR IP): Performed by: NURSE PRACTITIONER

## 2021-12-02 PROCEDURE — 6370000000 HC RX 637 (ALT 250 FOR IP): Performed by: INTERNAL MEDICINE

## 2021-12-02 PROCEDURE — 80048 BASIC METABOLIC PNL TOTAL CA: CPT

## 2021-12-02 PROCEDURE — 85027 COMPLETE CBC AUTOMATED: CPT

## 2021-12-02 PROCEDURE — 86140 C-REACTIVE PROTEIN: CPT

## 2021-12-02 PROCEDURE — 36415 COLL VENOUS BLD VENIPUNCTURE: CPT

## 2021-12-02 RX ADMIN — Medication 10 ML: at 09:33

## 2021-12-02 RX ADMIN — APIXABAN 5 MG: 5 TABLET, FILM COATED ORAL at 09:31

## 2021-12-02 RX ADMIN — NYSTATIN 500000 UNITS: 100000 SUSPENSION ORAL at 09:32

## 2021-12-02 RX ADMIN — BUDESONIDE AND FORMOTEROL FUMARATE DIHYDRATE 2 PUFF: 160; 4.5 AEROSOL RESPIRATORY (INHALATION) at 09:32

## 2021-12-02 RX ADMIN — Medication 50 MG: at 09:32

## 2021-12-02 RX ADMIN — OXYCODONE HYDROCHLORIDE AND ACETAMINOPHEN 500 MG: 500 TABLET ORAL at 09:32

## 2021-12-02 ASSESSMENT — PAIN SCALES - GENERAL
PAINLEVEL_OUTOF10: 0
PAINLEVEL_OUTOF10: 0

## 2021-12-02 NOTE — PLAN OF CARE
Problem: Airway Clearance - Ineffective  Goal: Achieve or maintain patent airway  12/2/2021 0140 by Sonu Díaz RN  Outcome: Met This Shift  12/1/2021 1434 by Stanislaw Cage RN  Outcome: Ongoing     Problem: Gas Exchange - Impaired  Goal: Absence of hypoxia  12/2/2021 0140 by Sonu Díaz RN  Outcome: Met This Shift  12/1/2021 1434 by Stanislaw Cage RN  Outcome: Ongoing  Goal: Promote optimal lung function  12/2/2021 0140 by Sonu Díaz RN  Outcome: Met This Shift  12/1/2021 1434 by Stanislaw Cage RN  Outcome: Ongoing     Problem: Breathing Pattern - Ineffective  Goal: Ability to achieve and maintain a regular respiratory rate  12/2/2021 0140 by Sonu Díaz RN  Outcome: Met This Shift  12/1/2021 1434 by Stanislaw Cage RN  Outcome: Ongoing     Problem:  Body Temperature -  Risk of, Imbalanced  Goal: Ability to maintain a body temperature within defined limits  12/2/2021 0140 by Sonu Díaz RN  Outcome: Met This Shift  12/1/2021 1434 by Stanislaw Cage RN  Outcome: Ongoing  Goal: Will regain or maintain usual level of consciousness  12/2/2021 0140 by Sonu Díaz RN  Outcome: Met This Shift  12/1/2021 1434 by Stanislaw Cage RN  Outcome: Ongoing  Goal: Complications related to the disease process, condition or treatment will be avoided or minimized  12/2/2021 0140 by Sonu Díaz RN  Outcome: Met This Shift  12/1/2021 1434 by Stanislaw Cage RN  Outcome: Ongoing     Problem: Isolation Precautions - Risk of Spread of Infection  Goal: Prevent transmission of infection  12/2/2021 0140 by Sonu Díaz RN  Outcome: Met This Shift  12/1/2021 1434 by Stanislaw Cage RN  Outcome: Ongoing     Problem: Nutrition Deficits  Goal: Optimize nutritional status  12/2/2021 0140 by Sonu Díaz RN  Outcome: Met This Shift  12/1/2021 1434 by Stanislaw Cage RN  Outcome: Ongoing     Problem: Risk for Fluid Volume Deficit  Goal: Maintain normal heart rhythm  12/2/2021 0140 by Demetrice Chavira RN  Outcome: Met This Shift  12/1/2021 1434 by Zac Jose RN  Outcome: Ongoing  Goal: Maintain absence of muscle cramping  12/2/2021 0140 by Demetrice Chavira RN  Outcome: Met This Shift  12/1/2021 1434 by Zac Jose RN  Outcome: Ongoing  Goal: Maintain normal serum potassium, sodium, calcium, phosphorus, and pH  12/2/2021 0140 by Demetrice Chavira RN  Outcome: Met This Shift  12/1/2021 1434 by Zac Jose RN  Outcome: Ongoing     Problem: Loneliness or Risk for Loneliness  Goal: Demonstrate positive use of time alone when socialization is not possible  12/2/2021 0140 by Demetrice Chavira RN  Outcome: Met This Shift  12/1/2021 1434 by Zac Jose RN  Outcome: Ongoing     Problem: Fatigue  Goal: Verbalize increase energy and improved vitality  12/2/2021 0140 by Demetrice Chavira RN  Outcome: Met This Shift  12/1/2021 1434 by Zac Jose RN  Outcome: Ongoing     Problem: Patient Education: Go to Patient Education Activity  Goal: Patient/Family Education  12/2/2021 0140 by Demetrice Chavira RN  Outcome: Met This Shift  12/1/2021 1434 by Zac Jose RN  Outcome: Ongoing     Problem: Falls - Risk of:  Goal: Will remain free from falls  Description: Will remain free from falls  12/2/2021 0140 by Demetrice Chavira RN  Outcome: Met This Shift  12/1/2021 1434 by Zac Jose RN  Outcome: Ongoing  Goal: Absence of physical injury  Description: Absence of physical injury  12/2/2021 0140 by Demetrice Chavira RN  Outcome: Met This Shift  12/1/2021 1434 by Zac Jose RN  Outcome: Ongoing

## 2021-12-02 NOTE — CARE COORDINATION
Care Coordination: updated ambulating pulse ox discussed in rounds today. Pt currently on 4 ltr nc at 89%. Per pulmonary, Wean oxygen and no discharge till off oxygen. Will touch base with nursing for ambulating pulse ox to check improvement for dc planning with any need for home 02. Addendum: as per assessment , wife will transport home, and no preference of dme if needed. Jeyson Nurse  PULSE OX ON ROOM AIR SITTING____%   PULSE OX ON ROOM AIR AMBULATING ____%   PULSE OX ON ____LITERS SITTING RECOVERY ____%   PULSE OX ON ____LITERS AMBULATING RECOVERY ___%   RN/ Please document Pulse Ox in PG note the following way;(Cut and Paste, Fill in Blanks).  If home o2 is needed.  If > 4 ltr needed to recover, please show insignificant recovery on 4 ltr and then increase for recovery rate   [facility] [place] : [phone]   [facility]- closes 5 pm   [phone]   [facility] DME after hours phone: Sanjana [address] [place] [address]-closes 5 pm [phone]

## 2021-12-02 NOTE — PROGRESS NOTES
Comprehensive Nutrition Assessment    Type and Reason for Visit:  Initial (LOS)    Nutrition Recommendations/Plan: No nutritional supplementation recommended at this time. Nutrition Assessment:  Patients po intake seems adequate, averaging % of most meals served ; adm w/ SOB ; s/p syncope ; noted acute respiratory failure with hypoxia ; pt COVID-19 positive ; hx of DVT ; no ONS recommended at this time    Malnutrition Assessment:  Malnutrition Status:  Insufficient data    Context:  Acute Illness     Findings of the 6 clinical characteristics of malnutrition:  Energy Intake:  No significant decrease in energy intake  Weight Loss:  Unable to assess (d/t lack of weight history)     Body Fat Loss:  Unable to assess (d/t COVID isolation)     Muscle Mass Loss:  Unable to assess (d/t COVID isolation)    Fluid Accumulation:  No significant fluid accumulation     Strength:  Not Performed    Estimated Daily Nutrient Needs:  Energy (kcal):  2213-9944 (REE 1795 x 1.1 SF); Weight Used for Energy Requirements:  Current     Protein (g):   (1.2-1.4g/kg IBW); Weight Used for Protein Requirements:  Ideal        Fluid (ml/day):  3461-9079; Method Used for Fluid Requirements:  1 ml/kcal      Nutrition Related Findings:  -I&Os (-7.3 L), no edema, A&O x 4, active BS, rounded abd      Wounds:  None       Current Nutrition Therapies:    ADULT DIET; Regular; Low Fat/Low Chol/High Fiber/2 gm Na; Low Sodium (2 gm)    Anthropometric Measures:  · Height: 6' (182.9 cm)  · Current Body Weight: 215 lb (97.5 kg) (12/1, [facility])   · Admission Body Weight: 222 lb (100.7 kg) (11/27, [facility])    · Usual Body Weight:  (UTO ; no weights available in EMR hx from previous encounters)     · Ideal Body Weight: 178 lbs; % Ideal Body Weight 120.8 %   · BMI: 29.2  · BMI Categories: Overweight (BMI 25.0-29. 9)       Nutrition Diagnosis:   No nutrition diagnosis at this time     Nutrition Interventions:   Food and/or Nutrient Delivery: Continue Current Diet  Nutrition Education/Counseling:  Education not indicated   Coordination of Nutrition Care:  Continue to monitor while inpatient    Goals:  Pt will consume ~75% of meals served       Nutrition Monitoring and Evaluation:   Behavioral-Environmental Outcomes:  None Identified   Food/Nutrient Intake Outcomes:  Food and Nutrient Intake  Physical Signs/Symptoms Outcomes:  Biochemical Data, Chewing or Swallowing, GI Status, Fluid Status or Edema, Hemodynamic Status, Meal Time Behavior, Nutrition Focused Physical Findings, Weight, Skin     Discharge Planning:     Too soon to determine     Electronically signed by Manpreet Ramos RD, LD on 12/2/21 at 3:41 PM EST    Contact: [phone]

## 2021-12-02 NOTE — PROGRESS NOTES
Subjective:    Feeling okay without complaint breathing better  no CP or SOB  No fever or chills   No uncontrolled pain  No vomiting or diarrhea   No events reported overnight     Objective:    /75   Pulse 62   Temp 97.5 °F (36.4 °C) (Oral)   Resp 18   Ht 6' (1.829 m)   Wt 215 lb 9.6 oz (97.8 kg)   SpO2 (!) 89%   BMI 29.24 kg/m²     In: 120 [P.O.:120]  Out: 450   In: 120   Out: 450 [Urine:450]    General appearance: NAD, conversant, looks a lot better today  HEENT: AT/NC, MMM  Neck: FROM, supple  Lungs: Diminished throughout, 15 L high flow nonrebreather nonproductive cough  CV: RRR, no MRGs  Vasc: Radial pulses 2+  Abdomen: Soft, non-tender; no masses or HSM  Extremities: No peripheral edema or digital cyanosis  Skin: no rash, lesions or ulcers  Psych: Alert and oriented to person, place and time  Neuro: Alert and interactive     Recent Labs     12/02/21  0743   WBC 5.5   HGB 14.9   HCT 43.8          Recent Labs     12/02/21  0743      K 4.9      CO2 24   BUN 32*   CREATININE 1.2   CALCIUM 8.6       Assessment:    Principal Problem:    Pneumonia due to COVID-19 virus  Active Problems:    Acute respiratory failure with hypoxia (HCC)    History of DVT (deep vein thrombosis)    Hypoxia    Primary hypertension    Elevated serum creatinine    Syncope  Resolved Problems:    * No resolved hospital problems.  *      Plan:  59-year-old male with a past medical history of hypertension and DVTs admitted to telemetry unit with     Covid pneumonia  -Barcitinib 4 mg - discontinued   -remdesivir  -rocephin-procalcitonin low at 0.16, repeat 0.09  -Toci given 11/27/2021  -Decadron 6 mg iv daily  -Vitamin D, zinc, vitamin C  -Droplet plus isolation  -Follow inflammatory markers-CRP 3.3     Acute hypoxic respiratory failure 89-94 % on 4L nasal cannula  -Monitor O2 saturations and supplement oxygen to keep saturations greater than 89%, wean as necessary, incentive spirometer, no nebulizer's, Encourage ISP use  Encourage proning. As needed    History of DVT  Apixaban  Monitor for evidence of bleeding or thrombosis    Hypertension-chronically -currently low BP  Hold losartan and hydrochlorothiazide  Monitor    DVT Prophylaxis   PT/OT  Discharge planning DC home plus home O2 today-pulse oximetry check--  Pulse ox on room air sitting 84%  Pulse ox on room air ambulating 85%  Pulse ox on 4 liters sitting recovery 90%  Pulse ox on 2 liters ambulating recovery 93%            Home with home O2      Trevor Pradhan MD

## 2021-12-02 NOTE — PROGRESS NOTES
[place]  Department of Internal Medicine  Division of Pulmonary, Critical Care and Sleep Medicine  Progress Note    Brandyn Mcdowell DO, MPH, [facility], [place], [address] [place] [address], MD, [address] Jacobs DO, [facility]      Patient: Cisco Morales  MRN: [id number]  : 1955    Encounter Time: 11:16 AM     Date of Admission: 2021  1:45 PM    Primary Care Physician: Tk Diaz MD    Reason for Consultation: COVID and Increase Oxygen needs. Request from family. SUBJECTIVE:    Sat 88% noted  Down to b/w 4 and 6 liters   Desat with ambulation   Should be OFF oxygen before discharge   CRP Low  BMP/CMP normal     OBJECTIVE:     PHYSICAL EXAM:   VITALS:   Vitals:    21 0738 21 1530 21 2215 21 0753   BP: 106/70 108/65 110/65 125/75   Pulse: 71 88 55 62   Resp: 18 20 18 18   Temp: 96.7 °F (35.9 °C) 97.6 °F (36.4 °C) 97.3 °F (36.3 °C) 97.5 °F (36.4 °C)   TempSrc: Temporal Oral Infrared Oral   SpO2: 94% 90% 91% (!) 89%   Weight:       Height:            Intake/Output Summary (Last 24 hours) at 2021 1116  Last data filed at 2021 2235  Gross per 24 hour   Intake 120 ml   Output 850 ml   Net -730 ml        CONSTITUTIONAL:    Alert  SKIN:     No rash, No discoloration  HEENT:     EOMI, MMM, No thrush  NECK:    No bruits, No JVP apprechiated  CV:      Sinus,  No murmur, No rubs, No gallops  PULMONARY:   Couse BS,  No Wheezing, No Rales, No Rhonchi      No noted egophony  ABDOMEN:     Soft, non-tender. BS normal. No R/R/G  EXT:    No deformities . No clubbing. No lower extremity edema, No venous stasis  PULSE:   Appears equal and palpable.   PSYCHIATRIC:  Seems appropriate, No acute psycosis  MS:    No fractures, No gross weakness  NEUROLOGIC:   The clinical assessment is non-focal     DATA: IMAGING & TESTING:     LABORATORY TESTS:    CBC:   Lab Results   Component Value Date    WBC 5.5 2021    RBC 5.19 2021    HGB 14.9 12/02/2021    HCT 43.8 12/02/2021    MCV 84.4 12/02/2021    MCH 28.7 12/02/2021    MCHC 34.0 12/02/2021    RDW 12.5 12/02/2021     12/02/2021    MPV 9.1 12/02/2021     CMP:    Lab Results   Component Value Date     12/02/2021    K 4.9 12/02/2021    K 3.6 11/26/2021     12/02/2021    CO2 24 12/02/2021    BUN 32 12/02/2021    CREATININE 1.2 12/02/2021    GFRAA >60 12/02/2021    LABGLOM >60 12/02/2021    GLUCOSE 128 12/02/2021    PROT 6.9 11/25/2021    LABALBU 3.5 11/25/2021    CALCIUM 8.6 12/02/2021    BILITOT 0.6 11/25/2021    ALKPHOS 54 11/25/2021    AST 31 11/25/2021    ALT 23 11/25/2021     Magnesium:    Lab Results   Component Value Date    MG 1.9 11/25/2021     ABG:  No results found for: PH, PCO2, PO2, HCO3, BE, THGB, TCO2, O2SAT  FERRITIN:    Lab Results   Component Value Date    FERRITIN 479 11/26/2021     Fibrinogen Level:  No components found for: FIB     PRO-BNP:   Lab Results   Component Value Date    PROBNP 74 11/25/2021      ABGs: No results found for: PH, PO2, PCO2  Hemoglobin A1C: No components found for: HGBA1C    IMAGING:  Imaging tests were completed and reviewed and discussed radiology and care team involved and reveals   XR CHEST PORTABLE    Result Date: 11/25/2021  EXAMINATION: ONE XRAY VIEW OF THE CHEST 11/25/2021 14:38 COMPARISON: None available to me at the time of this interpretation. HISTORY: ORDERING SYSTEM PROVIDED HISTORY: hyspxia TECHNOLOGIST PROVIDED HISTORY: Reason for exam:->hyspxia What reading provider will be dictating this exam?->CRC FINDINGS: This exam is slightly limited by patient body habitus, as well as AP portable, semi-upright technique. Given these factors: Multiple cardiac monitoring leads and respiratory support devices overlie the patient's chest.  A metallic chain encircles the patient's neck.  There is minimal/mild bibasilar subsegmental atelectasis, and equivalent bilateral pulmonary hypoinflation/elevation of the hemidiaphragms, all overall slightly worse on the right. Heart size and pulmonary vascularity are top-normal in prominence, partially artifactually-accentuated by the above processes. Osseous structures appear grossly unremarkable. No other plain radiographic abnormalities are noted. .     1. Slightly limited exam 2. Minimal/mild bibasilar subsegmental atelectasis, and equivalent bilateral pulmonary hypoinflation/elevation of the hemidiaphragms, all overall slightly worse on the right. . RECOMMENDATION: 1. Recommend follow-up thoracic imaging, as directed clinically. .       Assessment:   1. Severe CoVID 19 pneumonia  2. Acute Respiratory Failure  3. Acute tracheobronchitis        Plan:   1. Remdesivir for 5 days total   2. Was given TOCI  3. Add rocephin total of 5 days/finished   4. Monitor inflammatory profile  5. Bronchodilators Q12  6. Maintain anticoagulation  7. No Beri with clot history   8. Continue PT OT  9. Wean oxygen and no discharge till off oxygen  10. Encourge self proning  11. Await lung recovery  12.          Jan Poe DO DO, MPH, [facility]ordano  Professor of Internal Medicine  Pulmonary, Critical Care and Sleep Medicine

## 2021-12-02 NOTE — PROGRESS NOTES
Pulse ox on room air sitting 84%  Pulse ox on room air ambulating 85%  Pulse ox on 4 liters sitting recovery 90%  Pulse ox on 2 liters ambulating recovery 93%

## 2021-12-03 ENCOUNTER — CARE COORDINATION (OUTPATIENT)
Dept: CARE COORDINATION | Age: 66
End: 2021-12-03

## 2021-12-03 NOTE — CARE COORDINATION
Care Transitions Outreach Attempt    Call within 2 business days of discharge: Yes   Attempted to reach patient by telephone. Unable to reach patient. Left HIPAA compliant message requesting a return call. Will attempt to reach patient again. Patient: Saqib Ames Patient : 1955 MRN: [id number]    Last Discharge [facility]       Complaint Diagnosis Description Type Department Provider    21 Loss of Consciousness Acute respiratory failure due to COVID-19 [facility]) . .. ED to Hosp-Admission (Discharged) (ADMITTED) Cesilia Leonardo MD; [place]. .. Was this an external facility discharge?  No Discharge Facility: [facility]      Noted following upcoming appointments from discharge chart review:   [facility] follow up appointment(s):   Future Appointments   Date Time Provider Leana Gtz   2021  1:15 PM Rosamaria Crisostomo MD [facility]/MED [facility]     Non-[facility] follow up appointment(s):

## 2021-12-03 NOTE — CARE COORDINATION
Patient contacted regarding COVID-19 diagnosis and pulse oximeter ordered at discharge. Discussed COVID-19 related testing which was available at this time. Test results were positive. Patient informed of results, if available? Yes. LPN Care Coordinator contacted the patient by telephone to perform post discharge assessment. Call within 2 business days of discharge: Yes. Verified name and  with patient as identifiers. Provided introduction to self, and explanation of the CTN/ACM role, and reason for call due to risk factors for infection and/or exposure to COVID-19. Symptoms reviewed with patient who verbalized the following symptoms: fatigue, cough, shortness of breath and dizziness/lightheadedness. Due to no new or worsening symptoms encounter was not routed to provider for escalation. Discussed follow-up appointments. If no appointment was previously scheduled, appointment scheduling offered: Yes. [facility] follow up appointment(s):   Future Appointments   Date Time Provider Leana Gtz   2021  1:15 PM Angelika Lane MD [facility]     Non-[facility] follow up appointment(s): Patient's wife to contact PCP today for follow up appt. Non-face-to-face services provided:  Obtained and reviewed discharge summary and/or continuity of care documents  Education of patient/family/caregiver/guardian to support self-management-symptom management     Advance Care Planning:   Does patient have an Advance Directive:  reviewed and current. Educated patient about risk for severe COVID-19 due to risk factors according to CDC guidelines. LPN CC reviewed discharge instructions, medical action plan and red flag symptoms with the patient who verbalized understanding. Discussed COVID vaccination status: No. Education provided on COVID-19 vaccination as appropriate. Discussed exposure protocols and quarantine with CDC Guidelines.  Patient was given an opportunity to verbalize any questions and concerns

## 2021-12-03 NOTE — DISCHARGE SUMMARY
Physician Discharge Summary     Patient ID:  Elaine Coppola  [id number]  77 y.o.  1955    Admit date: 11/25/2021    Discharge date and time:  12/2/2021    Discharge Diagnoses: Principal Problem:    Pneumonia due to COVID-19 virus  Active Problems:    Acute respiratory failure with hypoxia (HCC)    History of DVT (deep vein thrombosis)    Hypoxia    Primary hypertension    Elevated serum creatinine    Syncope  Resolved Problems:    * No resolved hospital problems. *      Consults: IP CONSULT TO INTERNAL MEDICINE  IP CONSULT TO SOCIAL WORK    Procedures: See below    Hospital Course: 51-year-old male with a past medical history of hypertension and DVTs admitted to telemetry unit with     Covid pneumonia  -Barcitinib 4 mg - discontinued   -remdesivir  -rocephin-procalcitonin low at 0.16, repeat 0.09  -Toci given 11/27/2021  -Decadron 6 mg iv daily  -Vitamin D, zinc, vitamin C  -Droplet plus isolation  -Follow inflammatory markers-CRP 3.3      Acute hypoxic respiratory failure 89-94 % on 4L nasal cannula  -Monitor O2 saturations and supplement oxygen to keep saturations greater than 89%, wean as necessary, incentive spirometer, no nebulizer's, Encourage ISP use  Encourage proning.   As needed     History of DVT  Apixaban  Monitor for evidence of bleeding or thrombosis     Hypertension-chronically -currently low BP  Hold losartan and hydrochlorothiazide  Monitor     DVT Prophylaxis   PT/OT  Discharge planning DC home plus home O2 today-pulse oximetry check--  Pulse ox on room air sitting 84%  Pulse ox on room air ambulating 85%  Pulse ox on 4 liters sitting recovery 90%  Pulse ox on 2 liters ambulating recovery 93%            Home with home O2       Discharge Exam:  See progress note from today    Condition:  Stable    Disposition: home    Patient Instructions:   Discharge Medication List as of 12/2/2021  6:18 PM      START taking these medications    Details   dexamethasone (DECADRON) 6 MG tablet Take 1 tablet by mouth daily (with breakfast) for 5 days, Disp-5 tablet, R-0Normal         CONTINUE these medications which have NOT CHANGED    Details   acetaminophen (TYLENOL) 500 MG tablet Take 500 mg by mouth every 6 hours as needed for PainHistorical Med      diphenhydrAMINE-APAP, sleep, (TYLENOL PM EXTRA STRENGTH)  MG tablet Take 1 tablet by mouth nightly as needed for SleepHistorical Med      DM-APAP-CPM (CORICIDIN HBP) -2 MG TABS Take 1 tablet by mouth 3 times daily as needed Historical Med      ELIQUIS 5 MG TABS tablet Take 5 mg by mouth 2 times daily, DAWHistorical Med         STOP taking these medications       azithromycin (ZITHROMAX Z-ANTHONY) 250 MG tablet Comments:   Reason for Stopping:         losartan-hydroCHLOROthiazide (HYZAAR) 100-25 MG per tablet Comments:   Reason for Stopping:         ibuprofen (ADVIL;MOTRIN) 200 MG tablet Comments:   Reason for Stopping:         Elastic Bandages & Supports (JOBST KNEE HIGH COMPRESSION SM) MISC Comments:   Reason for Stopping:             Activity: activity as tolerated  Diet: regular diet    Follow-up with 1wk PCP, Pulmonology    Note that over 30 minutes was spent in preparing discharge papers, discussing discharge with patient, staff, consultants, medication review, arranging follow up, etc.    Signed:  Analia Akers MD  12/3/2021  7:28 AM

## 2021-12-06 ENCOUNTER — HOSPITAL ENCOUNTER (OUTPATIENT)
Dept: GENERAL RADIOLOGY | Age: 66
Discharge: HOME | End: 2021-12-08
Payer: COMMERCIAL

## 2021-12-06 ENCOUNTER — HOSPITAL ENCOUNTER (OUTPATIENT)
Age: 66
Discharge: HOME | End: 2021-12-08
Payer: COMMERCIAL

## 2021-12-06 DIAGNOSIS — U07.1 ACUTE COVID-19: ICD-10-CM

## 2021-12-06 DIAGNOSIS — U07.1 COVID-19: Primary | ICD-10-CM

## 2021-12-06 PROCEDURE — 71045 X-RAY EXAM CHEST 1 VIEW: CPT

## 2021-12-07 ENCOUNTER — OFFICE VISIT (OUTPATIENT)
Dept: PULMONOLOGY | Age: 66
End: 2021-12-07
Payer: COMMERCIAL

## 2021-12-07 VITALS
OXYGEN SATURATION: 98 % | BODY MASS INDEX: 28.44 KG/M2 | HEIGHT: 72 IN | RESPIRATION RATE: 16 BRPM | HEART RATE: 69 BPM | TEMPERATURE: 96.2 F | WEIGHT: 210 LBS

## 2021-12-07 DIAGNOSIS — J12.82 PNEUMONIA DUE TO COVID-19 VIRUS: Primary | ICD-10-CM

## 2021-12-07 DIAGNOSIS — U07.1 PNEUMONIA DUE TO COVID-19 VIRUS: Primary | ICD-10-CM

## 2021-12-07 LAB
EXPIRATORY TIME: 7.59 SEC
FEF 25-75% %PRED-PRE: 84 L/SEC
FEF 25-75% PRED: 2.75 L/SEC
FEF 25-75%-PRE: 2.32 L/SEC
FEV1 %PRED-PRE: 66 %
FEV1 PRED: 3.56 L
FEV1/FVC %PRED-PRE: 103 %
FEV1/FVC PRED: 76 %
FEV1/FVC: 79 %
FEV1: 2.38 L
FVC %PRED-PRE: 64 %
FVC PRED: 4.69 L
FVC: 3.02 L
PEF %PRED-PRE: 74 L/SEC
PEF PRED: 9.15 L/SEC
PEF-PRE: 6.8 L/SEC

## 2021-12-07 PROCEDURE — G8427 DOCREV CUR MEDS BY ELIG CLIN: HCPCS | Performed by: INTERNAL MEDICINE

## 2021-12-07 PROCEDURE — 99203 OFFICE O/P NEW LOW 30 MIN: CPT | Performed by: INTERNAL MEDICINE

## 2021-12-07 PROCEDURE — 3017F COLORECTAL CA SCREEN DOC REV: CPT | Performed by: INTERNAL MEDICINE

## 2021-12-07 PROCEDURE — 99214 OFFICE O/P EST MOD 30 MIN: CPT | Performed by: INTERNAL MEDICINE

## 2021-12-07 PROCEDURE — 1111F DSCHRG MED/CURRENT MED MERGE: CPT | Performed by: INTERNAL MEDICINE

## 2021-12-07 PROCEDURE — 94010 BREATHING CAPACITY TEST: CPT | Performed by: INTERNAL MEDICINE

## 2021-12-07 PROCEDURE — G8484 FLU IMMUNIZE NO ADMIN: HCPCS | Performed by: INTERNAL MEDICINE

## 2021-12-07 PROCEDURE — 1036F TOBACCO NON-USER: CPT | Performed by: INTERNAL MEDICINE

## 2021-12-07 PROCEDURE — 1123F ACP DISCUSS/DSCN MKR DOCD: CPT | Performed by: INTERNAL MEDICINE

## 2021-12-07 PROCEDURE — G8417 CALC BMI ABV UP PARAM F/U: HCPCS | Performed by: INTERNAL MEDICINE

## 2021-12-07 PROCEDURE — 4040F PNEUMOC VAC/ADMIN/RCVD: CPT | Performed by: INTERNAL MEDICINE

## 2021-12-07 ASSESSMENT — PULMONARY FUNCTION TESTS
FEV1: 2.38
FEV1/FVC: 79
FVC_PERCENT_PREDICTED_PRE: 64
FEV1/FVC_PREDICTED: 76
FEV1/FVC_PERCENT_PREDICTED_PRE: 103
FVC: 3.02
FEV1_PREDICTED: 3.56
FEV1_PERCENT_PREDICTED_PRE: 66
FVC_PREDICTED: 4.69

## 2021-12-07 NOTE — PROGRESS NOTES
Pt to be scheduled to be seen in clinic in 6 weeks for inoculations. Pt will have a 3 month follow up in clinic in 3 months. No imaging/testing ordered at this time.

## 2021-12-10 NOTE — PROGRESS NOTES
[place]  Department of Internal Medicine  Division of Pulmonary, Critical Care and Sleep Medicine  Office Progress Note    Jace Patel DO, MPH, [address] MD [place], [facility]  Ivon HERNANDEZ, [facility]      Patient: Samanta File  MRN: [id number]  : 1955    Encounter Time: 7:11 PM     Date of Admission: No admission date for patient encounter. Primary Care Physician: Sandee Orozco MD    Reason for Consultation: COVID and Increase Oxygen needs. Request from family. HISTORY OF PRESENT ILLNESS : Samanta File 77 y.o. male was seen in consultation regarding the above chief compliant. Patient is a 40-year-old male with a past medical history of an acute DVT, and hypertension. Patient presented to the ED with complaints of shortness of breath cough and congestion x4 days. Patient states as he was cooking yesterday he also became very dizzy and weak. Patient states he was diagnosed with an acute DVT 1 month ago and was started on Eliquis and has a follow-up appointment with vascular. Patient's DVT was provoked from airplane ride out [facility].  Patient stated his shortness of breath worsened with exertion and relieved with rest.  Patient thought he just had a cold and he went to see his PCP and he was started on azithromycin. Patient is not vaccinated for COVID-19.     Patient is alert and oriented on examination. Patient denies any chest pain, fever, chills, abdominal pain, and headache. Patient is currently requiring 7 L O2 for saturation of 91%. Patient does not have a history of smoking. ER work-up reveals elevated CRP of 3.3, lactic acid 2.5, and Covid positive. Patient is admitted for further testing and treatment. Over the next 24 hours the patients oxygen requirements increase and family requested pulmonary assessment    As you know Ronn Richards was discharged from the hospital on 4 L of oxygen.   We have been weaning it slowly with constant follow-up with his daughter. I answered all questions. His oxygen saturation with 500 feet ambulation remained at 93%. At rest he is fine. No chest pain was noted chest x-ray done prior to the office visit shows remaining COVID fibrotic groundglass changes. His appetite is returned there is no sensorineural loss noted. We stopped his steroids and inhalers. PAST MEDICAL HISTORY:  has a past medical history of Acute deep vein thrombosis (DVT) of femoral vein of right lower extremity ([address] DVT of leg (deep venous thrombosis) ([address] Hypertension, Leg swelling, Superficial phlebitis of right leg, and Varicose veins of both lower extremities with pain. SURGICAL HISTORY:  has a past surgical history that includes hernia repair (Left); Colonoscopy; and [place] tooth extraction. SOCIAL HISTORY:  reports that he has never smoked. He has never used smokeless tobacco. He reports previous alcohol use. He reports that he does not use drugs. FAMILY  HISTORY: family history includes High Blood Pressure in his father; Other in his father and mother. MEDICATIONS:    Prior to Admission medications    Medication Sig Start Date End Date Taking? Authorizing Provider   acetaminophen (TYLENOL) 500 MG tablet Take 500 mg by mouth every 6 hours as needed for Pain  Patient not taking: Reported on 12/3/2021    Historical Provider, MD   diphenhydrAMINE-APAP, sleep, (TYLENOL PM EXTRA STRENGTH)  MG tablet Take 1 tablet by mouth nightly as needed for Sleep  Patient not taking: Reported on 12/3/2021    Historical Provider, MD   DM-APAP-CPM (CORICIDIN HBP) -2 MG TABS Take 1 tablet by mouth 3 times daily as needed   Patient not taking: Reported on 12/3/2021    Historical Provider, MD   ELIQUIS 5 MG TABS tablet Take 5 mg by mouth 2 times daily 7/29/21   Historical Provider, MD       ALLERGIES: Patient has no known allergies.        REVIEW OF SYSTEMS:  Otherwise negative if not reported or listed IMAGING & TESTING:     LABORATORY TESTS:    CBC:   Lab Results   Component Value Date    WBC 5.5 12/02/2021    RBC 5.19 12/02/2021    HGB 14.9 12/02/2021    HCT 43.8 12/02/2021    MCV 84.4 12/02/2021    MCH 28.7 12/02/2021    MCHC 34.0 12/02/2021    RDW 12.5 12/02/2021     12/02/2021    MPV 9.1 12/02/2021     CMP:    Lab Results   Component Value Date     12/02/2021    K 4.9 12/02/2021    K 3.6 11/26/2021     12/02/2021    CO2 24 12/02/2021    BUN 32 12/02/2021    CREATININE 1.2 12/02/2021    GFRAA >60 12/02/2021    LABGLOM >60 12/02/2021    GLUCOSE 128 12/02/2021    PROT 6.9 11/25/2021    LABALBU 3.5 11/25/2021    CALCIUM 8.6 12/02/2021    BILITOT 0.6 11/25/2021    ALKPHOS 54 11/25/2021    AST 31 11/25/2021    ALT 23 11/25/2021     Magnesium:    Lab Results   Component Value Date    MG 1.9 11/25/2021     ABG:  No results found for: PH, PCO2, PO2, HCO3, BE, THGB, TCO2, O2SAT  FERRITIN:    Lab Results   Component Value Date    FERRITIN 479 11/26/2021     Fibrinogen Level:  No components found for: FIB     PRO-BNP:   Lab Results   Component Value Date    PROBNP 74 11/25/2021      ABGs: No results found for: PH, PO2, PCO2  Hemoglobin A1C: No components found for: HGBA1C    IMAGING:  Imaging tests were completed and reviewed and discussed radiology and care team involved and reveals   XR CHEST PORTABLE    Result Date: 11/25/2021  EXAMINATION: ONE XRAY VIEW OF THE CHEST 11/25/2021 14:38 COMPARISON: None available to me at the time of this interpretation. HISTORY: ORDERING SYSTEM PROVIDED HISTORY: hyspxia TECHNOLOGIST PROVIDED HISTORY: Reason for exam:->hyspxia What reading provider will be dictating this exam?->CRC FINDINGS: This exam is slightly limited by patient body habitus, as well as AP portable, semi-upright technique. Given these factors: Multiple cardiac monitoring leads and respiratory support devices overlie the patient's chest.  A metallic chain encircles the patient's neck.  There is minimal/mild bibasilar subsegmental atelectasis, and equivalent bilateral pulmonary hypoinflation/elevation of the hemidiaphragms, all overall slightly worse on the right. Heart size and pulmonary vascularity are top-normal in prominence, partially artifactually-accentuated by the above processes. Osseous structures appear grossly unremarkable. No other plain radiographic abnormalities are noted. .     1. Slightly limited exam 2. Minimal/mild bibasilar subsegmental atelectasis, and equivalent bilateral pulmonary hypoinflation/elevation of the hemidiaphragms, all overall slightly worse on the right. . RECOMMENDATION: 1. Recommend follow-up thoracic imaging, as directed clinically. .       Assessment:   1. Severe CoVID 19 pneumonia  2. Acute Respiratory Failure  3. Hypoxic respiratory failure        Plan:   Overall we are very pleased on how he is improving slowly we remain him on oxygen with ambulation only at 2 L. We will reevaluate in 6 to 8 weeks to see if we can remove the oxygen. Continue follow-up of his x-ray was recommended as we follow for clearing of the infiltrates. Anticoagulation was stopped as there is no benefit in the literature of continuing that post infection and he is quite ambulatory. Exercise program and limitations were discussed. No dietary restrictions noted. Additional questions were all answered.       Makeda Squires DO DO, MPH, Ekta Almodovar  Professor of Internal Medicine  Pulmonary, Critical Care and Sleep Medicine

## 2021-12-16 ENCOUNTER — OFFICE VISIT (OUTPATIENT)
Dept: VASCULAR SURGERY | Age: 66
End: 2021-12-16
Payer: COMMERCIAL

## 2021-12-16 VITALS — WEIGHT: 210 LBS | BODY MASS INDEX: 28.44 KG/M2 | HEIGHT: 72 IN

## 2021-12-16 DIAGNOSIS — Z86.79 HISTORY OF SUPERFICIAL PHLEBITIS: ICD-10-CM

## 2021-12-16 DIAGNOSIS — I83.893 VARICOSE VEINS OF BILATERAL LOWER EXTREMITIES WITH OTHER COMPLICATIONS: ICD-10-CM

## 2021-12-16 DIAGNOSIS — Z86.718 HISTORY OF DVT (DEEP VEIN THROMBOSIS): Primary | ICD-10-CM

## 2021-12-16 PROBLEM — M79.89 LEG SWELLING: Status: RESOLVED | Noted: 2021-08-05 | Resolved: 2021-12-16

## 2021-12-16 PROBLEM — J96.01 ACUTE RESPIRATORY FAILURE WITH HYPOXIA (HCC): Status: RESOLVED | Noted: 2021-11-25 | Resolved: 2021-12-16

## 2021-12-16 PROBLEM — I83.813 VARICOSE VEINS OF BOTH LOWER EXTREMITIES WITH PAIN: Status: RESOLVED | Noted: 2021-08-05 | Resolved: 2021-12-16

## 2021-12-16 PROBLEM — R55 SYNCOPE: Status: RESOLVED | Noted: 2021-11-25 | Resolved: 2021-12-16

## 2021-12-16 PROBLEM — I80.01 SUPERFICIAL PHLEBITIS OF RIGHT LEG: Status: RESOLVED | Noted: 2021-08-05 | Resolved: 2021-12-16

## 2021-12-16 PROBLEM — I82.411 ACUTE DEEP VEIN THROMBOSIS (DVT) OF FEMORAL VEIN OF RIGHT LOWER EXTREMITY (HCC): Status: RESOLVED | Noted: 2021-08-05 | Resolved: 2021-12-16

## 2021-12-16 PROBLEM — R09.02 HYPOXIA: Status: RESOLVED | Noted: 2021-11-25 | Resolved: 2021-12-16

## 2021-12-16 PROCEDURE — 3017F COLORECTAL CA SCREEN DOC REV: CPT | Performed by: SURGERY

## 2021-12-16 PROCEDURE — 1123F ACP DISCUSS/DSCN MKR DOCD: CPT | Performed by: SURGERY

## 2021-12-16 PROCEDURE — 4040F PNEUMOC VAC/ADMIN/RCVD: CPT | Performed by: SURGERY

## 2021-12-16 PROCEDURE — G8427 DOCREV CUR MEDS BY ELIG CLIN: HCPCS | Performed by: SURGERY

## 2021-12-16 PROCEDURE — 99214 OFFICE O/P EST MOD 30 MIN: CPT | Performed by: SURGERY

## 2021-12-16 PROCEDURE — G8417 CALC BMI ABV UP PARAM F/U: HCPCS | Performed by: SURGERY

## 2021-12-16 PROCEDURE — 1036F TOBACCO NON-USER: CPT | Performed by: SURGERY

## 2021-12-16 PROCEDURE — G8484 FLU IMMUNIZE NO ADMIN: HCPCS | Performed by: SURGERY

## 2021-12-16 PROCEDURE — 1111F DSCHRG MED/CURRENT MED MERGE: CPT | Performed by: SURGERY

## 2021-12-16 NOTE — PROGRESS NOTES
Chief Complaint:   Chief Complaint   Patient presents with    Check-Up     rt. leg         HPI: Patient came to the office with his wife, for the evaluation of history of extensive thrombosis of the right greater saphenous vein with thrombus extending to the saphenofemoral junction and into the femoral vein by 1 cm, he was still with anticoagulation, in August, however recently was hospitalized, for 6 days, because of Covid, doing better recuperating    Tells me the leg overall is doing better    No history of any chest pain or shortness of breath      Patient denies any focal lateralizing neurological symptoms like loss of speech, vision or loss of function of extremity    Patient can walk a few blocks , and denies any symptoms of rest pain    No Known Allergies    Current Outpatient Medications   Medication Sig Dispense Refill    acetaminophen (TYLENOL) 500 MG tablet Take 500 mg by mouth every 6 hours as needed for Pain       diphenhydrAMINE-APAP, sleep, (TYLENOL PM EXTRA STRENGTH)  MG tablet Take 1 tablet by mouth nightly as needed for Sleep       DM-APAP-CPM (CORICIDIN HBP) -2 MG TABS Take 1 tablet by mouth 3 times daily as needed       ELIQUIS 5 MG TABS tablet Take 5 mg by mouth 2 times daily       No current facility-administered medications for this visit.        Past Medical History:   Diagnosis Date    Acute deep vein thrombosis (DVT) of femoral vein of right lower extremity ([address] 8/5/2021    DVT of leg (deep venous thrombosis) (HCC)     right    History of superficial phlebitis 12/16/2021    Hypertension     Leg swelling 8/5/2021    Superficial phlebitis of right leg 8/5/2021    Varicose veins of bilateral lower extremities with other complications 20/29/1196    Varicose veins of both lower extremities with pain 8/5/2021       Past Surgical History:   Procedure Laterality Date    COLONOSCOPY      HERNIA REPAIR Left     WISDOM TOOTH EXTRACTION         Family History   Problem Relation Age of Onset    Other Mother         (R) ankle swelling    Other Father         (R) ankle swelling    High Blood Pressure Father        Social History     Socioeconomic History    Marital status:      Spouse name: Not on file    Number of children: Not on file    Years of education: Not on file    Highest education level: Not on file   Occupational History    Not on file   Tobacco Use    Smoking status: Never Smoker    Smokeless tobacco: Never Used   Vaping Use    Vaping Use: Never used   Substance and Sexual Activity    Alcohol use: Not Currently     Comment: very rare    Drug use: Never    Sexual activity: Not on file   Other Topics Concern    Not on file   Social History Narrative    Not on file     Social Determinants of Health     Financial Resource Strain:     Difficulty of Paying Living Expenses: Not on file   Food Insecurity:     Worried About [address] in the Last Year: Not on file    Kathie of Food in the Last Year: Not on file   Transportation Needs:     Lack of Transportation (Medical): Not on file    Lack of Transportation (Non-Medical):  Not on file   Physical Activity:     Days of Exercise per Week: Not on file    Minutes of Exercise per Session: Not on file   Stress:     Feeling of Stress : Not on file   Social Connections:     Frequency of Communication with Friends and Family: Not on file    Frequency of Social Gatherings with Friends and Family: Not on file    Attends [employer] Services: Not on file    Active Member of Clubs or Organizations: Not on file    Attends Club or Organization Meetings: Not on file    Marital Status: Not on file   Intimate Partner Violence:     Fear of Current or Ex-Partner: Not on file    Emotionally Abused: Not on file    Physically Abused: Not on file    Sexually Abused: Not on file   Housing Stability:     Unable to Pay for Housing in the Last Year: Not on file    Number of [place] in the Last Year: Not on file    Unstable Housing in the Last Year: Not on file       Review of Systems:    Eyes:  No blurring, diplopia or vision loss. Respiratory:  No cough, pleuritic chest pain, dyspnea, or wheezing. Recent hospitalization with Covid virus and Covid pneumonia  Cardiovascular: No angina, palpitations . Musculoskeletal:  No arthritis or weakness. Neurologic:  No paralysis, paresis, paresthesia, seizures or headache. Endocrinology:           Physical Exam:  General appearance:  Alert, awake, oriented x 3. No distress. Eyes:  Conjunctivae appear normal; PERRL  Neck:  No jugular venous distention, lymphadenopathy or thyromegaly. No evidence of carotid bruit  Lungs:  Clear to ausculation bilaterally. No rhonchi, crackles, wheezes  Heart:  Regular rate and rhythm. No rub or murmur  Abdomen:  Soft, non-tender. No masses, organomegaly. Musculoskeletal : No joint effusions, tenderness swelling    Neuro: Speech is intact. Moving all extremities. No focal motor or sensory deficits      Extremities:  Both feet are warm to touch. The color of both feet is normal.    Evidence of resolving superficial thrombophlebitis noted without any tenderness    No leg swelling    Pulses Right  Left    Brachial 3 3    Radial    3=normal   Femoral 2 2  2=diminished   Popliteal    1=barely palpable   Dorsalis pedis 2 2  0=absent   Posterior tibial    4=aneurysmal             Other pertinent information:1. The past medical records were reviewed. 2.  The recent hospital records were reviewed, the D-dimer was done only once, normal in the hospital    3. No CTA of the chest was done, chest x-ray revealed patchy bilateral pulmonary infiltrates    Assessment:    1. History of DVT (deep vein thrombosis)    2. History of superficial phlebitis    3.  Varicose veins of bilateral lower extremities with other complications              Plan:       I had a long and detailed discussion patient his wife, risks benefits and alternatives were explained, informed him from purely a vascular perspective, he has completed the duration of anticoagulation, does not need it anymore, but would recommend to take low-dose aspirin on a long-term basis at least for the next 6 months because of thrombophlebitis of the entire greater saphenous vein    However, in view of recent Covid infection with hospitalization, with pertinent hypercoagulability, it is felt it is reasonable prudent to continue the Eliquis pending work-up with a D-dimer and bilateral venous ultrasound of both lower extremities then make a decision, and the interim worsens to call me if any increasing symptoms    All his questions were answered              Orders Placed This Encounter   Procedures    US DUP LOWER EXTREMITIES BILATERAL VENOUS    D-Dimer, Quantitative             Indicated follow-up: Call as needed

## 2021-12-17 DIAGNOSIS — Z86.79 HISTORY OF SUPERFICIAL PHLEBITIS: ICD-10-CM

## 2021-12-17 DIAGNOSIS — Z86.718 HISTORY OF DVT (DEEP VEIN THROMBOSIS): ICD-10-CM

## 2021-12-17 DIAGNOSIS — I83.893 VARICOSE VEINS OF BILATERAL LOWER EXTREMITIES WITH OTHER COMPLICATIONS: ICD-10-CM

## 2021-12-17 LAB — D DIMER: <200 NG/ML DDU

## 2021-12-20 ENCOUNTER — HOSPITAL ENCOUNTER (OUTPATIENT)
Dept: ULTRASOUND IMAGING | Age: 66
Discharge: HOME | End: 2021-12-22
Payer: COMMERCIAL

## 2021-12-20 DIAGNOSIS — Z86.79 HISTORY OF SUPERFICIAL PHLEBITIS: ICD-10-CM

## 2021-12-20 DIAGNOSIS — I83.893 VARICOSE VEINS OF BILATERAL LOWER EXTREMITIES WITH OTHER COMPLICATIONS: ICD-10-CM

## 2021-12-20 DIAGNOSIS — Z86.718 HISTORY OF DVT (DEEP VEIN THROMBOSIS): ICD-10-CM

## 2021-12-20 PROCEDURE — 93970 EXTREMITY STUDY: CPT

## 2021-12-21 ENCOUNTER — TELEPHONE (OUTPATIENT)
Dept: VASCULAR SURGERY | Age: 66
End: 2021-12-21

## 2021-12-24 ENCOUNTER — CLINICAL DOCUMENTATION (OUTPATIENT)
Dept: VASCULAR SURGERY | Age: 66
End: 2021-12-24

## 2021-12-24 NOTE — PROGRESS NOTES
Message left for the patient and his cell phone as well as home number, regarding the results, venous ultrasound normal no DVT, D-dimer normal, but for now continued anticoagulation for total of 6 weeks as a mention to him in the office because of recent Covid situation, and after take low-dose aspirin for 6 months, call the office to any questions or concerns

## 2021-12-27 ENCOUNTER — TELEPHONE (OUTPATIENT)
Dept: VASCULAR SURGERY | Age: 66
End: 2021-12-27

## 2021-12-28 NOTE — TELEPHONE ENCOUNTER
Messages left last week and again today, venous ultrasound study normal, D-dimer normal, recommend continuing the anticoagulation for 6 weeks because of recent Covid situation and past history of deep vein thrombosis, and after take 80 mg aspirin daily for 6 months, to call the office to discuss further if any questions or concerns and call me anytime for any pain or swelling of the legs in the future

## 2022-01-19 ENCOUNTER — TELEPHONE (OUTPATIENT)
Dept: VASCULAR SURGERY | Age: 67
End: 2022-01-19

## 2022-05-05 ENCOUNTER — TELEPHONE (OUTPATIENT)
Dept: PULMONOLOGY | Age: 67
End: 2022-05-05

## 2022-05-05 DIAGNOSIS — R06.02 SOB (SHORTNESS OF BREATH): Primary | ICD-10-CM

## 2022-05-05 DIAGNOSIS — Z01.812 PRE-PROCEDURE LAB EXAM: ICD-10-CM

## 2022-05-05 NOTE — TELEPHONE ENCOUNTER
Mailed a letter to patient informing him that his PFT is scheduled for 5-20-22 at 7:30 am at the [facility]. He must arrive by 7:00 am. He is to have no caffeine for 24 hours prior to test and no resp meds for at least 4 hours prior to test. He must also bring in proof of his COVID vaccines

## 2022-05-20 ENCOUNTER — PATIENT MESSAGE (OUTPATIENT)
Dept: PULMONOLOGY | Age: 67
End: 2022-05-20

## 2022-05-20 ENCOUNTER — HOSPITAL ENCOUNTER (OUTPATIENT)
Dept: GENERAL RADIOLOGY | Age: 67
Discharge: HOME | End: 2022-05-22
Payer: COMMERCIAL

## 2022-05-20 ENCOUNTER — HOSPITAL ENCOUNTER (OUTPATIENT)
Dept: PULMONOLOGY | Age: 67
Discharge: HOME | End: 2022-05-20
Payer: COMMERCIAL

## 2022-05-20 DIAGNOSIS — Z01.812 PRE-PROCEDURE LAB EXAM: ICD-10-CM

## 2022-05-20 DIAGNOSIS — R06.02 SOB (SHORTNESS OF BREATH): ICD-10-CM

## 2022-05-20 LAB
B.E.: 1 MMOL/L (ref -3–3)
COHB: 0.8 % (ref 0–1.5)
CRITICAL: ABNORMAL
DATE ANALYZED: ABNORMAL
DATE OF COLLECTION: ABNORMAL
HCO3: 24.4 MMOL/L (ref 22–26)
HHB: 2.1 % (ref 0–5)
LAB: ABNORMAL
Lab: ABNORMAL
METHB: 0.3 % (ref 0–1.5)
MODE: ABNORMAL
O2 CONTENT: 20.6 ML/DL
O2 SATURATION: 97.9 % (ref 92–98.5)
O2HB: 96.8 % (ref 94–97)
OPERATOR ID: 2856
PATIENT TEMP: 37 C
PCO2: 35.3 MMHG (ref 35–45)
PH BLOOD GAS: 7.46 (ref 7.35–7.45)
PO2: 96.1 MMHG (ref 75–100)
SOURCE, BLOOD GAS: ABNORMAL
THB: 15.1 G/DL (ref 11.5–16.5)
TIME ANALYZED: 738

## 2022-05-20 PROCEDURE — 94060 EVALUATION OF WHEEZING: CPT

## 2022-05-20 PROCEDURE — 94726 PLETHYSMOGRAPHY LUNG VOLUMES: CPT

## 2022-05-20 PROCEDURE — 94729 DIFFUSING CAPACITY: CPT

## 2022-05-20 PROCEDURE — 71045 X-RAY EXAM CHEST 1 VIEW: CPT

## 2022-05-20 PROCEDURE — 82805 BLOOD GASES W/O2 SATURATION: CPT

## 2022-05-20 PROCEDURE — 94729 DIFFUSING CAPACITY: CPT | Performed by: INTERNAL MEDICINE

## 2022-05-20 PROCEDURE — 94060 EVALUATION OF WHEEZING: CPT | Performed by: INTERNAL MEDICINE

## 2022-05-20 PROCEDURE — 36600 WITHDRAWAL OF ARTERIAL BLOOD: CPT

## 2022-05-20 PROCEDURE — 94726 PLETHYSMOGRAPHY LUNG VOLUMES: CPT | Performed by: INTERNAL MEDICINE

## 2022-05-21 NOTE — PROCEDURES
[address] [address]                               PULMONARY FUNCTION    PATIENT NAME: Tommie Vázquez                       :        1955  MED REC NO:   [id number]                            ROOM:  ACCOUNT NO:   [de-identified]                           ADMIT DATE: 2022  PROVIDER:     Rosamaria Holstein, DO    DATE OF PROCEDURE:  2022    INDICATION:  A 15-year-old male, 72 inches, 183 pounds, with shortness  of breath, nonsmoker. FINDINGS:  Pulmonary function test revealed forced vital capacity of  5.39 liters, 115% of predicted, with an FEV1 of 4.25 liters, 120% of  predicted, with no bronchodilator response. The FEV1/FVC ratio is  normal at 78% with inspiratory time of 8.44 seconds. Maximum voluntary  ventilation 174 liters per minute, 126% of predicted. Static lung volumes with slow vital capacity of 7.34 liters, 157% of  predicted. Inspiratory capacity of 5.57 liters, 158% of predicted. Expiratory reserve volume 0.75 liters, 62% of predicted. Thoracic gas  volume of 5.35 liters, 135% of predicted. Residual volume of 3.53  liters, 141% of predicted. Total lung capacity of 10.87 liters, 146% of  predicted, with an RV/TLC ratio of 92% of predicted. Diffusion capacity  is normal at 28.45 mL/min per mmHg which is 81% of predicted. Arterial blood gas showed a pH 7.45, PaCO2 of 35 mmHg, PaO2 of 96 mmHg  with bicarbonate of 24 mmol/L with a base excess of 1.0 and saturation  of 97%. IMPRESSION:  Overall normal pulmonary function test with no definitive  airflow obstruction, hyperinflation, reversibility, or air trapping. There is no restrictive process and the diffusion capacity is normal,  indicates no loss in gas transfer at the alveolar capillary bed. Arterial blood gas shows fairly normal acid-base physiology with normal  oxygenation with saturations safe on the oxygen-hemoglobin curve. Testing was reported on room air. It should be noted the above  pulmonary function test all have numbers beyond the upper limit of  normal, may indicate an inaccurate height assessment. Clinical  correlation is needed.   Even with that stated, likely lung function  could be normal.        Gerson Pichardo DO    D: 05/20/2022 14:29:52       T: 05/20/2022 14:32:38     [id number]  Job#: [id number]     Doc#: [id number]    CC:

## 2022-06-11 ENCOUNTER — TELEPHONE (OUTPATIENT)
Dept: PULMONOLOGY | Age: 67
End: 2022-06-11

## 2022-06-11 NOTE — TELEPHONE ENCOUNTER
[facility]  Department of Internal Medicine  Division of Pulmonary, Critical Care and Sleep Medicine  Office Progress Note    Cristiano Ballard DO, MPH, Pilo Taylor MD, [facility]  [place] , [place] [facility]      Patient: Merced Casey  MRN: [id number]  : 1955    Encounter Time: 4:04 PM     Date of Admission: No admission date for patient encounter. Primary Care Physician: Ovi Helm MD    Reason for Consultation: COVID and Increase Oxygen needs. Request from family. HISTORY OF PRESENT ILLNESS : Merced Casey 79 y.o. male was seen in consultation regarding the above chief compliant. Patient is a 59-year-old male with a past medical history of an acute DVT, and hypertension. Patient presented to the ED with complaints of shortness of breath cough and congestion x4 days. Patient states as he was cooking yesterday he also became very dizzy and weak. Patient states he was diagnosed with an acute DVT 1 month ago and was started on Eliquis and has a follow-up appointment with vascular. Patient's DVT was provoked from airplane ride out [facility].  Patient stated his shortness of breath worsened with exertion and relieved with rest.  Patient thought he just had a cold and he went to see his PCP and he was started on azithromycin. Patient is not vaccinated for COVID-19.     Patient is alert and oriented on examination. Patient denies any chest pain, fever, chills, abdominal pain, and headache. Patient is currently requiring 7 L O2 for saturation of 91%. Patient does not have a history of smoking. ER work-up reveals elevated CRP of 3.3, lactic acid 2.5, and Covid positive. Patient is admitted for further testing and treatment. Over the next 24 hours the patients oxygen requirements increase and family requested pulmonary assessment    As you know Tyson Leiva was discharged from the hospital on 4 L of oxygen.   We have been weaning it slowly with constant follow-up with his daughter. I answered all questions. His oxygen saturation with 500 feet ambulation remained at 93%. At rest he is fine. No chest pain was noted chest x-ray done prior to the office visit shows remaining COVID fibrotic groundglass changes. His appetite is returned there is no sensorineural loss noted. We stopped his steroids and inhalers. PAST MEDICAL HISTORY:  has a past medical history of Acute deep vein thrombosis (DVT) of femoral vein of right lower extremity ([address] DVT of leg (deep venous thrombosis) (HCC), History of superficial phlebitis, Hypertension, Leg swelling, Superficial phlebitis of right leg, Varicose veins of bilateral lower extremities with other complications, and Varicose veins of both lower extremities with pain. SURGICAL HISTORY:  has a past surgical history that includes hernia repair (Left); Colonoscopy; and [place] tooth extraction. SOCIAL HISTORY:  reports that he has never smoked. He has never used smokeless tobacco. He reports previous alcohol use. He reports that he does not use drugs. FAMILY  HISTORY: family history includes High Blood Pressure in his father; Other in his father and mother. MEDICATIONS:    Prior to Admission medications    Medication Sig Start Date End Date Taking? Authorizing Provider   acetaminophen (TYLENOL) 500 MG tablet Take 500 mg by mouth every 6 hours as needed for Pain     Historical Provider, MD   diphenhydrAMINE-APAP, sleep, (TYLENOL PM EXTRA STRENGTH)  MG tablet Take 1 tablet by mouth nightly as needed for Sleep     Historical Provider, MD   DM-APAP-CPM (CORICIDIN HBP) -2 MG TABS Take 1 tablet by mouth 3 times daily as needed     Historical Provider, MD   ELIQUDYLON 5 MG TABS tablet Take 5 mg by mouth 2 times daily 7/29/21   Historical Provider, MD       ALLERGIES: Patient has no known allergies.        REVIEW OF SYSTEMS:  Otherwise negative if not reported or listed below  Constitutional:  No unanticipated weight loss. No change in sleep pattern or activity. No fevers, chills or rigors. Eyes:    No visual changes or diplopia. No scleral icterus. ENT:    No Headaches, hearing loss or vertigo. No mouth sores or sore throat. Cardiovascular:  + chest discomfort, palpitations. Respiratory:  + cough, No wheezing      No sputum production. No hemoptysis, pleuritic pain. Gastrointestinal:  No abdominal pain, appetite loss, blood in stools. No hematemesis  Genitourinary:  No dysuria, trouble voiding or hematuria. No nocturia. Musculoskeletal:   No weakness or joint complaints. Integumentary: No rashes or pruritis. Neurological:  No headache, numbness or tingling. Psychiatric:   No effect on mood, memory, mentation, or behavior. No anxiety or depression. Endocrine:   No excessive thirst, fluid intake, or urination. No tremor. Hematologic: No abnormal bruising or bleeding. Lymphatic:  No swollen lymph nodes. Immunologic:  No hives or hx of anaphaxsis. OBJECTIVE:     PHYSICAL EXAM:   VITALS:   Vitals:    12/07/21 1304   Pulse: 69   Resp: 16   Temp: 96.2 °F (35.7 °C)   SpO2: 98%   Weight: 210 lb (95.3 kg)   Height: 6' (1.829 m)      No intake or output data in the 24 hours ending 06/11/22 1604     CONSTITUTIONAL:   A&O x 3, NAD  SKIN:     No rash, No suspicious lesions or skin discoloration  HEENT:     EOMI, MMM, No thrush  NECK:    No bruits, No JVP apprechiated  CV:      Sinus,  No murmur, No rubs, No gallops  PULMONARY:   Couse BS,  No Wheezing, No Rales, No Rhonchi      No noted egophony  ABDOMEN:     Soft, non-tender. BS normal. No R/R/G  EXT:    No deformities . No clubbing. No lower extremity edema, No venous stasis  PULSE:   Appears equal and palpable.   PSYCHIATRIC:  Seems appropriate, No acute psycosis  MS:    No fractures, No gross weakness  NEUROLOGIC:   The clinical assessment is non-focal     DATA: IMAGING & TESTING:     LABORATORY TESTS:    CBC:   Lab Results   Component Value Date    WBC 5.5 12/02/2021    RBC 5.19 12/02/2021    HGB 14.9 12/02/2021    HCT 43.8 12/02/2021    MCV 84.4 12/02/2021    MCH 28.7 12/02/2021    MCHC 34.0 12/02/2021    RDW 12.5 12/02/2021     12/02/2021    MPV 9.1 12/02/2021     CMP:    Lab Results   Component Value Date     12/02/2021    K 4.9 12/02/2021    K 3.6 11/26/2021     12/02/2021    CO2 24 12/02/2021    BUN 32 12/02/2021    CREATININE 1.2 12/02/2021    GFRAA >60 12/02/2021    LABGLOM >60 12/02/2021    GLUCOSE 128 12/02/2021    PROT 6.9 11/25/2021    LABALBU 3.5 11/25/2021    CALCIUM 8.6 12/02/2021    BILITOT 0.6 11/25/2021    ALKPHOS 54 11/25/2021    AST 31 11/25/2021    ALT 23 11/25/2021     Magnesium:    Lab Results   Component Value Date    MG 1.9 11/25/2021     ABG:    Lab Results   Component Value Date    PH 7.457 05/20/2022    PCO2 35.3 05/20/2022    PO2 96.1 05/20/2022    HCO3 24.4 05/20/2022    BE 1.0 05/20/2022    O2SAT 97.9 05/20/2022     FERRITIN:    Lab Results   Component Value Date    FERRITIN 479 11/26/2021     Fibrinogen Level:  No components found for: FIB     PRO-BNP:   Lab Results   Component Value Date    PROBNP 74 11/25/2021      ABGs:   Lab Results   Component Value Date    PH 7.457 05/20/2022    PO2 96.1 05/20/2022    PCO2 35.3 05/20/2022     Hemoglobin A1C: No components found for: HGBA1C    IMAGING:  Imaging tests were completed and reviewed and discussed radiology and care team involved and reveals   XR CHEST PORTABLE    Result Date: 11/25/2021  EXAMINATION: ONE XRAY VIEW OF THE CHEST 11/25/2021 14:38 COMPARISON: None available to me at the time of this interpretation. HISTORY: ORDERING SYSTEM PROVIDED HISTORY: hyspxia TECHNOLOGIST PROVIDED HISTORY: Reason for exam:->hyspxia What reading provider will be dictating this exam?->CRC FINDINGS: This exam is slightly limited by patient body habitus, as well as AP portable, semi-upright technique.   Given these factors: Multiple cardiac monitoring leads and respiratory support devices overlie the patient's chest.  A metallic chain encircles the patient's neck. There is minimal/mild bibasilar subsegmental atelectasis, and equivalent bilateral pulmonary hypoinflation/elevation of the hemidiaphragms, all overall slightly worse on the right. Heart size and pulmonary vascularity are top-normal in prominence, partially artifactually-accentuated by the above processes. Osseous structures appear grossly unremarkable. No other plain radiographic abnormalities are noted. .     1. Slightly limited exam 2. Minimal/mild bibasilar subsegmental atelectasis, and equivalent bilateral pulmonary hypoinflation/elevation of the hemidiaphragms, all overall slightly worse on the right. . RECOMMENDATION: 1. Recommend follow-up thoracic imaging, as directed clinically. .         CHEST RADIOGRAPH: 5/202/2022 The lungs are without acute focal process.  There is no effusion or pneumothorax. The cardiomediastinal silhouette is without acute process. The osseous structures are without acute process. Assessment:   1. Severe CoVID 19 pneumonia  2. Acute Respiratory Failure  3. Hypoxic respiratory failure        Plan:  Chest film in 5/2022 was normal.  Overall we are very pleased on how he is improving slowly we remain him on oxygen with ambulation only at 2 L. We will reevaluate in 6 to 8 weeks to see if we can remove the oxygen. Continue follow-up of his x-ray was recommended as we follow for clearing of the infiltrates. Anticoagulation was stopped as there is no benefit in the literature of continuing that post infection and he is quite ambulatory. Exercise program and limitations were discussed. No dietary restrictions noted. Additional questions were all answered.       Catalina Willis DO DO, MPH, [facility] Castellanos  Professor of Internal Medicine  Pulmonary, Critical Care and Sleep Medicine

## 2022-07-07 ENCOUNTER — TELEPHONE (OUTPATIENT)
Dept: VASCULAR SURGERY | Age: 67
End: 2022-07-07

## 2022-07-07 ENCOUNTER — OFFICE VISIT (OUTPATIENT)
Dept: VASCULAR SURGERY | Age: 67
End: 2022-07-07
Payer: COMMERCIAL

## 2022-07-07 VITALS — HEIGHT: 72 IN | WEIGHT: 185 LBS | BODY MASS INDEX: 25.06 KG/M2

## 2022-07-07 DIAGNOSIS — Z86.718 HISTORY OF DVT (DEEP VEIN THROMBOSIS): ICD-10-CM

## 2022-07-07 DIAGNOSIS — M79.89 LEG SWELLING: Primary | ICD-10-CM

## 2022-07-07 PROBLEM — U07.1 PNEUMONIA DUE TO COVID-19 VIRUS: Status: RESOLVED | Noted: 2021-11-29 | Resolved: 2022-07-07

## 2022-07-07 PROBLEM — J12.82 PNEUMONIA DUE TO COVID-19 VIRUS: Status: RESOLVED | Noted: 2021-11-29 | Resolved: 2022-07-07

## 2022-07-07 PROCEDURE — G8417 CALC BMI ABV UP PARAM F/U: HCPCS | Performed by: SURGERY

## 2022-07-07 PROCEDURE — 1123F ACP DISCUSS/DSCN MKR DOCD: CPT | Performed by: SURGERY

## 2022-07-07 PROCEDURE — 1036F TOBACCO NON-USER: CPT | Performed by: SURGERY

## 2022-07-07 PROCEDURE — 3017F COLORECTAL CA SCREEN DOC REV: CPT | Performed by: SURGERY

## 2022-07-07 PROCEDURE — 99214 OFFICE O/P EST MOD 30 MIN: CPT | Performed by: SURGERY

## 2022-07-07 PROCEDURE — G8427 DOCREV CUR MEDS BY ELIG CLIN: HCPCS | Performed by: SURGERY

## 2022-07-07 RX ORDER — LOSARTAN POTASSIUM AND HYDROCHLOROTHIAZIDE 25; 100 MG/1; MG/1
1 TABLET ORAL DAILY
COMMUNITY

## 2022-07-07 RX ORDER — LORAZEPAM 1 MG/1
1 TABLET ORAL EVERY 6 HOURS PRN
COMMUNITY

## 2022-07-07 NOTE — PROGRESS NOTES
Chief Complaint:   Chief Complaint   Patient presents with    Circulatory Problem     rt. 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 while he was in [place] and was appropriately treated and since that time patient has no problems    Recently, patient was seen by his daughter, who recommended him vascular evaluation    Patient denies any major cardiopulmonary issues    Recently he was told by his PCP to cut down the dose of losartan      Patient denies any focal lateralizing neurological symptoms like loss of speech, vision or loss of function of extremity    Patient can walk a few blocks , and denies any symptoms of rest pain    No Known Allergies    Current Outpatient Medications   Medication Sig Dispense Refill    losartan-hydroCHLOROthiazide (HYZAAR) 100-25 MG per tablet Take 1 tablet by mouth daily      LORazepam (ATIVAN) 1 MG tablet Take 1 mg by mouth every 6 hours as needed for Anxiety.  acetaminophen (TYLENOL) 500 MG tablet Take 500 mg by mouth every 6 hours as needed for Pain       diphenhydrAMINE-APAP, sleep, (TYLENOL PM EXTRA STRENGTH)  MG tablet Take 1 tablet by mouth nightly as needed for Sleep       DM-APAP-CPM (CORICIDIN HBP) -2 MG TABS Take 1 tablet by mouth 3 times daily as needed       ELIQUIS 5 MG TABS tablet Take 5 mg by mouth 2 times daily (Patient not taking: Reported on 7/7/2022)       No current facility-administered medications for this visit.        Past Medical History:   Diagnosis Date    Acute deep vein thrombosis (DVT) of femoral vein of right lower extremity ([address] 8/5/2021    DVT of leg (deep venous thrombosis) ([facility])     right    History of superficial phlebitis 12/16/2021    Hypertension     Leg swelling 8/5/2021    Superficial phlebitis of right leg 8/5/2021    Varicose veins of bilateral lower extremities with other complications 08/42/2469    Varicose veins of both lower extremities with pain 8/5/2021       Past Surgical History:   Procedure Laterality Date    COLONOSCOPY      HERNIA REPAIR Left     WISDOM TOOTH EXTRACTION         Family History   Problem Relation Age of Onset    Other Mother         (R) ankle swelling    Other Father         (R) ankle swelling    High Blood Pressure Father        Social History     Socioeconomic History    Marital status:      Spouse name: Not on file    Number of children: Not on file    Years of education: Not on file    Highest education level: Not on file   Occupational History    Not on file   Tobacco Use    Smoking status: Never Smoker    Smokeless tobacco: Never Used   Vaping Use    Vaping Use: Never used   Substance and Sexual Activity    Alcohol use: Not Currently     Comment: very rare    Drug use: Never    Sexual activity: Not on file   Other Topics Concern    Not on file   Social History Narrative    Not on file     Social Determinants of Health     Financial Resource Strain:     Difficulty of Paying Living Expenses: Not on file   Food Insecurity:     Worried About Running Out of Food in the Last Year: Not on file    Kathie of Food in the Last Year: Not on file   Transportation Needs:     Lack of Transportation (Medical): Not on file    Lack of Transportation (Non-Medical):  Not on file   Physical Activity:     Days of Exercise per Week: Not on file    Minutes of Exercise per Session: Not on file   Stress:     Feeling of Stress : Not on file   Social Connections:     Frequency of Communication with Friends and Family: Not on file    Frequency of Social Gatherings with Friends and Family: Not on file    Attends Confucianism Services: Not on file    Active Member of Clubs or Organizations: Not on file    Attends Club or Organization Meetings: Not on file    Marital Status: Not on file   Intimate Partner Violence:     Fear of Current or Ex-Partner: Not on file    Emotionally Abused: Not on file    Physically Abused: Not on file    Sexually Abused: Not on file   Housing Stability:     Unable to Pay for Housing in the Last Year: Not on file    Number of [place] in the Last Year: Not on file    Unstable Housing in the Last Year: Not on file       Review of Systems:    Eyes:  No blurring, diplopia or vision loss. Respiratory:  No cough, pleuritic chest pain, dyspnea, or wheezing. Cardiovascular: No angina, palpitations . Hypertension  Musculoskeletal:  No arthritis or weakness. Neurologic:  No paralysis, paresis, paresthesia, seizures or headache. Endocrinology:           Physical Exam:  General appearance:  Alert, awake, oriented x 3. No distress. Eyes:  Conjunctivae appear normal; PERRL  Neck:  No jugular venous distention, lymphadenopathy or thyromegaly. No evidence of carotid bruit  Lungs:  Clear to ausculation bilaterally. No rhonchi, crackles, wheezes  Heart:  Regular rate and rhythm. No rub or murmur  Abdomen:  Soft, non-tender. No masses, organomegaly. Musculoskeletal : No joint effusions, tenderness swelling    Neuro: Speech is intact. Moving all extremities. No focal motor or sensory deficits      Extremities:  Both feet are warm to touch.  The color of both feet is normal.    Patient does have mild edema of the legs mainly above the ankle with some puffiness of the ankle joint on the right, particularly lateral malleolus clinically consistent with osteoarthritis versus tenosynovitis    No calf tenderness noted    Pulses Right  Left    Brachial 3 3    Radial    3=normal   Femoral 2 2  2=diminished   Popliteal    1=barely palpable   Dorsalis pedis 3 3 0=absent   Posterior tibial    4=aneurysmal             Other pertinent information:1. The past medical records were reviewed. Assessment:    1. Leg swelling    2. History of DVT (deep vein thrombosis)              Plan:       Discussed the patient and his wife, clinically it appears, the puffiness the ankle particularly over the lateral malleolus, consistent with tenosynovitis and osteoarthritis, minimal ankle edema noted bilaterally without tenderness of the calf    Patient was recommend to make sure that he does get complete checkup by his PCP including cardiopulmonary issues and also lab work to make sure his albumin level is satisfactory etc.    Also recommended baseline venous ultrasound study because of swelling of the legs and past history of thrombosis of saphenous vein with extension to the femoral vein and call me as needed with increasing symptoms, explained       All the questions were answered. Orders Placed This Encounter   Procedures    US DUP LOWER EXTREMITIES BILATERAL VENOUS             Indicated follow-up: Return if symptoms worsen or fail to improve.

## 2022-07-12 DIAGNOSIS — M79.89 LEG SWELLING: ICD-10-CM

## 2022-07-12 DIAGNOSIS — Z86.718 HISTORY OF DVT (DEEP VEIN THROMBOSIS): ICD-10-CM

## 2022-07-13 ENCOUNTER — TELEPHONE (OUTPATIENT)
Dept: VASCULAR SURGERY | Age: 67
End: 2022-07-13

## 2022-07-13 NOTE — TELEPHONE ENCOUNTER
Message left for the patient regarding the last venous ultrasound, no deep vein thrombosis, for now wear light over-the-counter compression stockings, if is concerned about the puffiness of the ankle joint especially on the right side laterally, to discuss with his PCP, regarding evaluation and may be need orthopedic evaluation as well, and call office if any questions or concerns

## 2022-07-15 ENCOUNTER — HOSPITAL ENCOUNTER (OUTPATIENT)
Age: 67
Discharge: HOME | End: 2022-07-17
Payer: COMMERCIAL

## 2022-07-15 ENCOUNTER — HOSPITAL ENCOUNTER (OUTPATIENT)
Dept: GENERAL RADIOLOGY | Age: 67
Discharge: HOME | End: 2022-07-17
Payer: COMMERCIAL

## 2022-07-15 DIAGNOSIS — M25.571 RIGHT ANKLE PAIN, UNSPECIFIED CHRONICITY: ICD-10-CM

## 2022-07-15 PROCEDURE — 73610 X-RAY EXAM OF ANKLE: CPT

## 2022-07-15 PROCEDURE — 73630 X-RAY EXAM OF FOOT: CPT

## 2022-07-20 ENCOUNTER — TELEPHONE (OUTPATIENT)
Dept: PULMONOLOGY | Age: 67
End: 2022-07-20

## 2022-07-20 NOTE — TELEPHONE ENCOUNTER
Pt called and said he had received letter canceling appt. Pt said he is doing great and doesn't believe he needs the dr services any longer and to cancel all future appt. Thanked pt and told pt to call if he has any problems in the future. Pt grateful.

## 2023-10-26 NOTE — TELEPHONE ENCOUNTER
October 26, 2023     Patient: Linda Barba   YOB: 1960   Date of Visit: 10/26/2023       To Whom It May Concern:    Linda Barba was seen in my clinic on 10/26/2023 at 2:30 pm. Please excuse Linda for her absence from work on this day 10/26/2023 through 10/29/2023. Linda may return to work on 10/30/2023 with no restrictions.     If you have any questions or concerns, please don't hesitate to call.         Sincerely,         Evonne Diego MD           Tried calling to confirm appt for 8-5-2021 with Dr Kolby Li and all circuts are busy.